# Patient Record
Sex: FEMALE | Race: WHITE | NOT HISPANIC OR LATINO | Employment: UNEMPLOYED
[De-identification: names, ages, dates, MRNs, and addresses within clinical notes are randomized per-mention and may not be internally consistent; named-entity substitution may affect disease eponyms.]

---

## 2017-07-08 ENCOUNTER — HEALTH MAINTENANCE LETTER (OUTPATIENT)
Age: 62
End: 2017-07-08

## 2017-07-21 ENCOUNTER — TELEPHONE (OUTPATIENT)
Dept: ENDOCRINOLOGY | Facility: CLINIC | Age: 62
End: 2017-07-21

## 2017-07-21 NOTE — TELEPHONE ENCOUNTER
Pt is concerned DEXA scan are read differently. Pt has DEXA scan scheduled 9/1/17 in [place]. Pt is experiencing inflammation between shoulder blades, pt seen provider for this and was told it is just inflammation between 5 vertebrae. Pt saw PCP today. Pt has prescription for Forteo until December 2017. Triglycerides are elevated, wondering if due to Forteo.   Informed pt that Dr. Casey will be in touch next week.

## 2017-08-04 ENCOUNTER — TELEPHONE (OUTPATIENT)
Dept: ENDOCRINOLOGY | Facility: CLINIC | Age: 62
End: 2017-08-04

## 2017-08-04 DIAGNOSIS — M81.0 SENILE OSTEOPOROSIS: Primary | ICD-10-CM

## 2017-08-04 NOTE — LETTER
Patient:  Nathalia Syed  :   1955  MRN:     [id number]        Ms.Kathy Annie Syed  [address] [place] [address]    17  Dear Mr. Delong    This letter is on behalf of Nathalia.  I am one of the endocrinologist working with her. She is now doing better but just needs to avoid risk for falls. She has been very compliant with the treatment program. She has the following issues:     #1 osteoporosis with fracture  This patient has osteoporosis with subsequent fracture.  She suffered multiple fractures and therefore is at high risk for future fractures even though she is complying with treatment.  I would recommend that accommodations be made to reduce her risk for fractures    #2 limitations created by health condition  I would recommend that the patient cannot lift more than 10 pounds as well as precautions  to reduce fall risks.    #3 accommodations adjusting to the work environment or schedule  I would recommend that the patient not be placed at risk for falls. 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 I anticipate that this would only take about 3-5% of her effort as she is able to function within the classroom.  She is otherwise functional and able to work and this very minor assistance (if needed) should not interfere with her ability to perform her job. I will be reassessing her yearly (she has an upcoming appointment).    If you have any questions, please feel free to contact my nurse at [phone] select option #3 for triage nurse.    Regards  Kavitha Casey MD

## 2017-08-04 NOTE — TELEPHONE ENCOUNTER
----- Message from Opal Rolle RN sent at 7/21/2017  4:56 PM CDT -----  Regarding: More info.  Pt is concerned DEXA scan are read differently. Pt has DEXA scan scheduled 9/1/17 in [place]. Pt is experiencing inflammation between shoulder blades, pt seen provider for this and was told it is just inflammation between 5 vertebrae. Pt saw PCP today. Pt has prescription for Forteo until December 2017. Triglycerides are elevated, wondering if due to Forteo.   Informed pt that you will be in touch next week.   Opal CASTRO  ----- Message -----     From: Kavitha Casey MD     Sent: 7/21/2017   4:52 PM       To: Opal Rolle RN    Please let pt know that I got her message and need to think about the best follow up plan - I will be in touch with her next week as I will be gone on Monday

## 2017-08-09 ENCOUNTER — TELEPHONE (OUTPATIENT)
Dept: ENDOCRINOLOGY | Facility: CLINIC | Age: 62
End: 2017-08-09

## 2017-08-09 NOTE — TELEPHONE ENCOUNTER
Social Work:  D/I: Request from Dr Casey to contact Pt re workplace issues re accommodations. 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 I encouraged her to seek help from an attny at the union or union rep in regards to these threats. Also encouraged her to read the Americans with Disabilities Act which is available online to get more info about her rights. She hasn't received the letter yet from Dr Casey (it should arrive soon) re accommodations at work.  P: Pt plans to f/u on the suggestions above so she is prepared for conversations with her leadership. She has my contact info for further needs.

## 2017-09-01 ENCOUNTER — TRANSFERRED RECORDS (OUTPATIENT)
Dept: HEALTH INFORMATION MANAGEMENT | Facility: CLINIC | Age: 62
End: 2017-09-01

## 2017-10-09 ENCOUNTER — TELEPHONE (OUTPATIENT)
Dept: ENDOCRINOLOGY | Facility: CLINIC | Age: 62
End: 2017-10-09

## 2017-10-09 NOTE — TELEPHONE ENCOUNTER
Pt had BMD done on 9/1/2017, with evidence of bone loss seen at the left total hip compared with the 2016 exam and increase of 2.6% in the lumbar spine.  Will scan in.

## 2017-10-20 ENCOUNTER — TELEPHONE (OUTPATIENT)
Dept: ENDOCRINOLOGY | Facility: CLINIC | Age: 62
End: 2017-10-20

## 2017-10-20 ENCOUNTER — OFFICE VISIT (OUTPATIENT)
Dept: ENDOCRINOLOGY | Facility: CLINIC | Age: 62
End: 2017-10-20

## 2017-10-20 VITALS — HEART RATE: 70 BPM | DIASTOLIC BLOOD PRESSURE: 81 MMHG | SYSTOLIC BLOOD PRESSURE: 146 MMHG

## 2017-10-20 DIAGNOSIS — M81.0 SENILE OSTEOPOROSIS: Primary | ICD-10-CM

## 2017-10-20 DIAGNOSIS — R51.9 NONINTRACTABLE HEADACHE, UNSPECIFIED CHRONICITY PATTERN, UNSPECIFIED HEADACHE TYPE: Primary | ICD-10-CM

## 2017-10-20 DIAGNOSIS — M81.0 SENILE OSTEOPOROSIS: ICD-10-CM

## 2017-10-20 LAB
ALBUMIN SERPL-MCNC: 3.9 G/DL (ref 3.4–5)
ALP SERPL-CCNC: 110 U/L (ref 40–150)
ALT SERPL W P-5'-P-CCNC: 34 U/L (ref 0–50)
ANION GAP SERPL CALCULATED.3IONS-SCNC: 7 MMOL/L (ref 3–14)
AST SERPL W P-5'-P-CCNC: 20 U/L (ref 0–45)
BILIRUB SERPL-MCNC: 0.4 MG/DL (ref 0.2–1.3)
BUN SERPL-MCNC: 14 MG/DL (ref 7–30)
CALCIUM SERPL-MCNC: 9.6 MG/DL (ref 8.5–10.1)
CHLORIDE SERPL-SCNC: 106 MMOL/L (ref 94–109)
CHOLEST SERPL-MCNC: 237 MG/DL
CO2 SERPL-SCNC: 28 MMOL/L (ref 20–32)
CREAT SERPL-MCNC: 0.6 MG/DL (ref 0.52–1.04)
CRP SERPL-MCNC: <2.9 MG/L (ref 0–8)
ERYTHROCYTE [SEDIMENTATION RATE] IN BLOOD BY WESTERGREN METHOD: 13 MM/H (ref 0–30)
GFR SERPL CREATININE-BSD FRML MDRD: >90 ML/MIN/1.7M2
GLUCOSE SERPL-MCNC: 71 MG/DL (ref 70–99)
HDLC SERPL-MCNC: 55 MG/DL
LDLC SERPL CALC-MCNC: 127 MG/DL
NONHDLC SERPL-MCNC: 182 MG/DL
POTASSIUM SERPL-SCNC: 3.5 MMOL/L (ref 3.4–5.3)
PROT SERPL-MCNC: 7.8 G/DL (ref 6.8–8.8)
SODIUM SERPL-SCNC: 140 MMOL/L (ref 133–144)
T4 FREE SERPL-MCNC: 0.82 NG/DL (ref 0.76–1.46)
TRIGL SERPL-MCNC: 274 MG/DL
TSH SERPL DL<=0.005 MIU/L-ACNC: 1.7 MU/L (ref 0.4–4)

## 2017-10-20 RX ORDER — DULOXETIN HYDROCHLORIDE 20 MG/1
20 CAPSULE, DELAYED RELEASE ORAL DAILY
Qty: 10 CAPSULE | Refills: 0 | Status: SHIPPED | OUTPATIENT
Start: 2017-10-20 | End: 2018-10-05

## 2017-10-20 ASSESSMENT — PAIN SCALES - GENERAL: PAINLEVEL: NO PAIN (0)

## 2017-10-20 NOTE — TELEPHONE ENCOUNTER
Spoke with nurse Sunshine, informed faxed progress note from today from Dr. Casey, confirmed fax below, confirmed receipt.   ----- Message from Kavitha Casey MD sent at 10/20/2017  3:12 PM CDT -----  Please fax letter from today to pt's primary provider at [phone] (fax)  And call [phone] to confirm

## 2017-10-20 NOTE — LETTER
"Patient:  Nathalia Syed  :   1955  MRN:     [id number]        Ms.Kathy Annie Syed  [address]  [facility] [address]        2017    Dear Nathalia    Here are your labs. This is most significant for an elevated triglyceride for hopefully the fish oil will help. Your markers of inflammation, thyroid, celiac disease, and vitamin D were all entirely normal. This is great news.    I would recommend that you follow up the effects of the fish oil the Cymbalta with your primary provider. I would defer to him whether he may consider add a \"fibrate\" to the fish oil to bring down the triglycerides further. Please also reduce the sugar in your diet. This will help.     If you have any questions, please feel free to contact my nurse at [phone] select option #3 for triage nurse  or  option #1 for scheduling related questions.    Regards    Kavitha Casey MD      Resulted Orders   25 Hydroxyvitamin D2 and D3   Result Value Ref Range    25 OH Vit D2 <5 ug/L    25 OH Vit D3 47 ug/L    25 OH Vit D total <52 20 - 75 ug/L      Comment:      Season, race, dietary intake, and treatment affect the concentration of   25-hydroxy-Vitamin D. Values may decrease during winter months and increase   during summer months. Values 20-29 ug/L may indicate Vitamin D insufficiency   and values <20 ug/L may indicate Vitamin D deficiency.  This test was developed and its performance characteristics determined by the   [facility],  [facility] Laboratory. It has   not been cleared or approved by the FDA. The laboratory is regulated under   CLIA as qualified to perform high-complexity testing. This test is used for   clinical purposes. It should not be regarded as investigational or for   research.     Comprehensive metabolic panel   Result Value Ref Range    Sodium 140 133 - 144 mmol/L    Potassium 3.5 3.4 - 5.3 mmol/L    Chloride 106 94 - 109 mmol/L    Carbon Dioxide 28 20 - 32 mmol/L    Anion " Gap 7 3 - 14 mmol/L    Glucose 71 70 - 99 mg/dL    Urea Nitrogen 14 7 - 30 mg/dL    Creatinine 0.60 0.52 - 1.04 mg/dL    GFR Estimate >90 >60 mL/min/1.7m2      Comment:      Non  GFR Calc    GFR Estimate If Black >90 >60 mL/min/1.7m2      Comment:       GFR Calc    Calcium 9.6 8.5 - 10.1 mg/dL    Bilirubin Total 0.4 0.2 - 1.3 mg/dL    Albumin 3.9 3.4 - 5.0 g/dL    Protein Total 7.8 6.8 - 8.8 g/dL    Alkaline Phosphatase 110 40 - 150 U/L    ALT 34 0 - 50 U/L    AST 20 0 - 45 U/L   TSH   Result Value Ref Range    TSH 1.70 0.40 - 4.00 mU/L   T4 free   Result Value Ref Range    T4 Free 0.82 0.76 - 1.46 ng/dL   Tissue transglutaminase reynaldo IgA and IgG   Result Value Ref Range    Tissue Transglutaminase Antibody IgA <1 <7 U/mL      Comment:      Negative  The tTG-IgA assay has limited utility for patients with decreased levels of   IgA. Screening for celiac disease should include IgA testing to rule out   selective IgA deficiency and to guide selection and interpretation of   serological testing. tTG-IgG testing may be positive in celiac disease   patients with IgA deficiency.      Tissue Transglutaminase Reynaldo IgG 1 <7 U/mL      Comment:      Negative   Lipid Profile   Result Value Ref Range    Cholesterol 237 (H) <200 mg/dL      Comment:      Desirable:       <200 mg/dl    Triglycerides 274 (H) <150 mg/dL      Comment:      Borderline high:  150-199 mg/dl  High:             200-499 mg/dl  Very high:       >499 mg/dl      HDL Cholesterol 55 >49 mg/dL    LDL Cholesterol Calculated 127 (H) <100 mg/dL      Comment:      Above desirable:  100-129 mg/dl  Borderline High:  130-159 mg/dL  High:             160-189 mg/dL  Very high:       >189 mg/dl      Non HDL Cholesterol 182 (H) <130 mg/dL      Comment:      Above Desirable:  130-159 mg/dl  Borderline high:  160-189 mg/dl  High:             190-219 mg/dl  Very high:       >219 mg/dl     CRP inflammation   Result Value Ref Range    CRP Inflammation  <2.9 0.0 - 8.0 mg/L       Cc: primary provider

## 2017-10-20 NOTE — MR AVS SNAPSHOT
"              After Visit Summary   10/20/2017    Nathalia Syed    MRN: [id number]           Patient Information     Date Of Birth          1955        Visit Information        Provider Department      10/20/2017 2:00 PM Kavitha Casey MD  [facility]        Today's Diagnoses     Nonintractable headache, unspecified chronicity pattern, unspecified headache type    -  1      Care Instructions    Freeze the fish oil- take 1 tablet three times per day    eval of headaches - consider neurology eval and MRI    Take a baby aspirin per day    Reduce the carbs (pastas, breads, cookies),     cymbalta at 20 mg daily    For scheduling appointments or labs, please request an appointment through [employer] or call [phone] select option #3 for triage nurse    For questions for your provider or the endocrine nurse, please send a [employer] message or call [phone] select option #3 for triage nurse    If this is an emergency overnight or on weekends, please call [phone]. This will get you the [employer] . Please ask for adult endocrinology \"on call\" and they will connect you to one of my colleagues who will always be available.          Follow-ups after your visit        Follow-up notes from your care team     Return in about 1 year (around 10/20/2018).      Future tests that were ordered for you today     Open Future Orders        Priority Expected Expires Ordered    Erythrocyte sedimentation rate auto Routine  10/20/2018 10/20/2017            Who to contact     Please call your clinic at [phone] to:    Ask questions about your health    Make or cancel appointments    Discuss your medicines    Learn about your test results    Speak to your doctor   If you have compliments or concerns about an experience at your clinic, or if you wish to file a complaint, please contact [facility] Patient Relations at [phone] or email us at [email or web address]   "       Additional Information About Your Visit        [email or web address] Information     [employer] gives you secure access to your electronic health record. If you see a primary care provider, you can also send messages to your care team and make appointments. If you have questions, please call your primary care clinic.  If you do not have a primary care provider, please call [phone] and they will assist you.      [employer] is an electronic gateway that provides easy, online access to your medical records. With [employer], you can request a clinic appointment, read your test results, renew a prescription or communicate with your care team.     To access your existing account, please contact your [facility] or call [phone] for assistance.        Care EveryWhere ID     This is your Care EveryWhere ID. This could be used by other organizations to access your [facility] medical records  [id number]        Your Vitals Were     Pulse                   70            Blood Pressure from Last 3 Encounters:   10/20/17 146/81   03/20/15 130/79    Weight from Last 3 Encounters:   No data found for Wt                 Today's Medication Changes          These changes are accurate as of: 10/20/17  2:51 PM.  If you have any questions, ask your nurse or doctor.               Start taking these medicines.        Dose/Directions    DULoxetine 20 MG EC capsule   Commonly known as:  CYMBALTA   Used for:  Nonintractable headache, unspecified chronicity pattern, unspecified headache type   Started by:  Kavitha Casey MD        Dose:  20 mg   Take 1 capsule (20 mg) by mouth daily   Quantity:  10 capsule   Refills:  0            Where to get your medicines      These medications were sent to [facility] [address] [address]    Hours:  TRANSPLANT PHONE NUMBER [phone] Phone:  [phone]     DULoxetine 20 MG EC  capsule                Primary Care Provider Office Phone # Fax #    Inder Nicholson [phone]       [place] [address] [facility] [address]        Equal Access to Services     MARITZA SHORT : Hadii nette ku hadcurtiso Soomaali, waaxda luqadaha, qaybta kaalmada adeegtamara, sheryl flores francericky mo laOliviajovanna kwan. So [facility] [phone].    ATENCIÓN: Si habla español, tiene a samayoa disposición servicios gratuitos de asistencia lingüística. [facility] [phone].    We comply with applicable federal civil rights laws and [place] laws. We do not discriminate on the basis of race, color, national origin, age, disability, sex, sexual orientation, or gender identity.            Thank you!     Thank you for choosing [facility]  for your care. Our goal is always to provide you with excellent care. Hearing back from our patients is one way we can continue to improve our services. Please take a few minutes to complete the written survey that you may receive in the mail after your visit with us. Thank you!             Your Updated Medication List - Protect others around you: Learn how to safely use, store and throw away your medicines at www.disposemymeds.org.          This list is accurate as of: 10/20/17  2:51 PM.  Always use your most recent med list.                   Brand Name Dispense Instructions for use Diagnosis    calcium carbonate 1250 MG tablet    OS-MEGHAN 500 mg [place]. [place]     Take 1,000 mg by mouth 2 times daily        DULoxetine 20 MG EC capsule    CYMBALTA    10 capsule    Take 1 capsule (20 mg) by mouth daily    Nonintractable headache, unspecified chronicity pattern, unspecified headache type       FAMVIR PO      Take 500 mg by mouth As needed        FORTEO 600 MCG/2.4ML Soln injection   Generic drug:  teriparatide (recombinant)     2.4 mL    Inject 0.08 mLs (20 mcg) Subcutaneous daily        MAGNESIUM OXIDE PO      Take 250 mg by mouth        POTASSIUM CITRATE PO            VITAMIN B 12 PO      Take 1,000 mcg by mouth        VITAMIN D (CHOLECALCIFEROL) PO      Take 2,000 Units by mouth daily

## 2017-10-20 NOTE — LETTER
10/20/2017       RE: Nathalia Syed  [address]  [facility] [address]     Dear Colleague,    Thank you for referring your patient, Nathalia Syed, to the [facility]. Please see a copy of my visit note below.    Nathalia is a 62 year old female presents today for RECHECK (Osteoporosis)  .    HPI  #1 osteoporosis  Patient was initially seen in March 2015.  She was noted to have osteoporosis.  She underwent menopause in 2007.  She has not been on hormone replacement therapy.  Extensive evaluation in 2015 was most significant for a high she is currently on Forteo as of September 2016.  She is uncertain whether she has side effects related to the Forteo-see below    #2 hypertension  The patient reports a history of recently labile blood pressure.  She reports that her home blood pressures generally 120/80 although recently her blood pressures [place] 140s to 160s over 80s..    #3 hypertriglycerides  The patient reports a long-standing history of normal triglycerides.  However over the past year, her triglycerides are in the 240s to 270s range.  There is no personal history of diabetes.  She is currently not on treatment.  She reports a strong family history of hypertriglyceridemia and hyperlipidemia cardiovascular events.    #4 headaches and dizziness  Since January 2017, the patient reports a history of headaches, eye pain, and dizziness.  She had an EEG in September 2017 which was unremarkable.  There was no clear exacerbating or relieving factors.  She reports that the intermittent head pain can be bilateral.  She's never had a head MRI.  She is uncertain whether her symptoms are necessarily related to her Forteo, although she was on the Forteo without symptoms for roughly 3 months.    Past Medical History  No past medical history on file.    Allergies  Allergies   Allergen Reactions     Tramadol Itching and Rash     Aspirin Rash     stomach      Codeine Rash and GI Disturbance     Rofecoxib Rash     Medications  Current Outpatient Prescriptions   Medication Sig Dispense Refill     teriparatide, recombinant, (FORTEO) 600 MCG/2.4ML SOLN injection Inject 0.08 mLs (20 mcg) Subcutaneous daily 2.4 mL 1     DULoxetine (CYMBALTA) 20 MG EC capsule Take 1 capsule (20 mg) by mouth daily 10 capsule 0     VITAMIN D, CHOLECALCIFEROL, PO Take 2,000 Units by mouth daily       calcium carbonate (OS-MEGHAN 500 MG [place]. [place]) 500 MG tablet Take 1,000 mg by mouth 2 times daily       POTASSIUM CITRATE PO        MAGNESIUM OXIDE PO Take 250 mg by mouth       Cyanocobalamin (VITAMIN B 12 PO) Take 1,000 mcg by mouth       Famciclovir (FAMVIR PO) Take 500 mg by mouth As needed       Family History  family history is not on file.  Social History  Social History     Social History     Marital status:      Spouse name: N/A     Number of children: N/A     Years of education: N/A     Occupational History     Not on file.     Social History Main Topics     Smoking status: Never Smoker     Smokeless tobacco: Not on file     Alcohol use Not on file     Drug use: Not on file     Sexual activity: Not on file     Other Topics Concern     Not on file     Social History Narrative     No narrative on file       ROS  General: no change in weight  CV: no complaints  Pulmonary: No complaints  GI: No complaints  : No complaints  MsK: No complaints  Infection: Pt denies  Skin: Pt denies rash  Heme: Pt denies  Neuro: Pt denies      Physical Exam  /81  Pulse 70  There is no height or weight on file to calculate BMI.    Exam:  GENERAL APPEARANCE: Alert and no distress  NECK: No lymphadenopathy appreciated  Eyes: No obvious retinopathy noted  Thyroid: No obvious nodules palpated   CV: RRR without M/R/G  Lungs: CTA bilaterally  Abdomen: Soft, Nontender, non distended, positive bowel sounds   Neuro: normal reflexes, no focal deficits  Skin: No infection in feet   Mood: Normal   Lymph: neg in neck  and supraclavicular area           RESULTS  Orders Only on 10/20/2017   Component Date Value Ref Range Status     Sodium 10/20/2017 140  133 - 144 mmol/L Final     Potassium 10/20/2017 3.5  3.4 - 5.3 mmol/L Final     Chloride 10/20/2017 106  94 - 109 mmol/L Final     Carbon Dioxide 10/20/2017 28  20 - 32 mmol/L Final     Anion Gap 10/20/2017 7  3 - 14 mmol/L Final     Glucose 10/20/2017 71  70 - 99 mg/dL Final     Urea Nitrogen 10/20/2017 14  7 - 30 mg/dL Final     Creatinine 10/20/2017 0.60  0.52 - 1.04 mg/dL Final     GFR Estimate 10/20/2017 >90  >60 mL/min/1.7m2 Final    Non  GFR Calc     GFR Estimate If Black 10/20/2017 >90  >60 mL/min/1.7m2 Final    African American GFR Calc     Calcium 10/20/2017 9.6  8.5 - 10.1 mg/dL Final     Bilirubin Total 10/20/2017 0.4  0.2 - 1.3 mg/dL Final     Albumin 10/20/2017 3.9  3.4 - 5.0 g/dL Final     Protein Total 10/20/2017 7.8  6.8 - 8.8 g/dL Final     Alkaline Phosphatase 10/20/2017 110  40 - 150 U/L Final     ALT 10/20/2017 34  0 - 50 U/L Final     AST 10/20/2017 20  0 - 45 U/L Final     TSH 10/20/2017 1.70  0.40 - 4.00 mU/L Final     T4 Free 10/20/2017 0.82  0.76 - 1.46 ng/dL Final     Cholesterol 10/20/2017 237* <200 mg/dL Final    Desirable:       <200 mg/dl     Triglycerides 10/20/2017 274* <150 mg/dL Final    Comment: Borderline high:  150-199 mg/dl  High:             200-499 mg/dl  Very high:       >499 mg/dl       HDL Cholesterol 10/20/2017 55  >49 mg/dL Final     LDL Cholesterol Calculated 10/20/2017 127* <100 mg/dL Final    Comment: Above desirable:  100-129 mg/dl  Borderline High:  130-159 mg/dL  High:             160-189 mg/dL  Very high:       >189 mg/dl       Non HDL Cholesterol 10/20/2017 182* <130 mg/dL Final    Comment: Above Desirable:  130-159 mg/dl  Borderline high:  160-189 mg/dl  High:             190-219 mg/dl  Very high:       >219 mg/dl       CRP Inflammation 10/20/2017 <2.9  0.0 - 8.0 mg/L Final               ASSESSMENT:    #1  osteoporosis  The patient is currently on Forteo.  Although her recent bone density shows possible loss of the level left hip, this is the best medication for bone building at this time.  We'll have the patient continue with Forteo for now and consider Prolia in the future once her she's done with her Forteo.  I think is highly unlikely that her symptoms of her headaches and are blood pressure related to the Forteo.  If the workup is otherwise unremarkable from a neurological standpoint (see below), we will revisit whether we may consider stopping the Forteo starting been also met.  However my preference is for the patient finished two years of Forteo if possible.    #2 hypertension  The patient has high blood pressure today noted in the 160s over 80s.  Reviewing her chart, she's had a history of normal blood pressure.  She does have significant lability and her blood pressure.  Extensive discussion with patient.  She has evidence of normal blood pressure, I'm wondering whether her mood and anxiety may be exacerbating her blood pressure.  We'll have the patient try Cymbalta 20 mg daily and follow up with her primary provider.  Although it is highly unlikely, I will have the patient obtain plasma metanephrine status rule out pheochromocytoma for her spell like symptoms and associated hypertension.    #3 hypertriglycerides  I am uncertain of the reason for her hypertriglyceridemia.  Her glucose is normal.  Her TSH is normal.  Not on estrogen.  She does not drink alcohol.  Recommended patient reduce carbohydrate intake.  She should take fish oil and 1000 mg three times per day.  She will follow up with her primary provider. Pt to take baby aspirin daily.     #4 possible anxiety  I am wondering whether the patient's anxiety might be causing lability of her blood pressure.  We'll have the patient start Cymbalta at 20 mg daily and she'll follow with her primary provider.  I will let him know.     #5 headaches  The patient  reports a history of intermittent headaches and eye pain.  Etiology uncertain.  We'll check sedimentation rate and C-reactive protein.  Recommended the patient consider head MRI and neurology follow-up locally.    We will plan to have the patient return to see me in one year, sooner if needed.    I spent 40 minutes with this patient face to face and explained the conditions and plans (more than 50% of time was spent in  discussion of bone management) . The patient understood and is satisfied with today's visit.       Kavitha Casey MD

## 2017-10-20 NOTE — PROGRESS NOTES
Nathalia is a 62 year old female presents today for RECHECK (Osteoporosis)  .    HPI  #1 osteoporosis  Patient was initially seen in March 2015.  She was noted to have osteoporosis.  She underwent menopause in 2007.  She has not been on hormone replacement therapy.  Extensive evaluation in 2015 was most significant for a high she is currently on Forteo as of September 2016.  She is uncertain whether she has side effects related to the Forteo-see below    #2 hypertension  The patient reports a history of recently labile blood pressure.  She reports that her home blood pressures generally 120/80 although recently her blood pressures [place] 140s to 160s over 80s..    #3 hypertriglycerides  The patient reports a long-standing history of normal triglycerides.  However over the past year, her triglycerides are in the 240s to 270s range.  There is no personal history of diabetes.  She is currently not on treatment.  She reports a strong family history of hypertriglyceridemia and hyperlipidemia cardiovascular events.    #4 headaches and dizziness  Since January 2017, the patient reports a history of headaches, eye pain, and dizziness.  She had an EEG in September 2017 which was unremarkable.  There was no clear exacerbating or relieving factors.  She reports that the intermittent head pain can be bilateral.  She's never had a head MRI.  She is uncertain whether her symptoms are necessarily related to her Forteo, although she was on the Forteo without symptoms for roughly 3 months.    Past Medical History  No past medical history on file.    Allergies  Allergies   Allergen Reactions     Tramadol Itching and Rash     Aspirin Rash     stomach     Codeine Rash and GI Disturbance     Rofecoxib Rash     Medications  Current Outpatient Prescriptions   Medication Sig Dispense Refill     teriparatide, recombinant, (FORTEO) 600 MCG/2.4ML SOLN injection Inject 0.08 mLs (20 mcg) Subcutaneous daily 2.4 mL 1     DULoxetine (CYMBALTA) 20 MG  EC capsule Take 1 capsule (20 mg) by mouth daily 10 capsule 0     VITAMIN D, CHOLECALCIFEROL, PO Take 2,000 Units by mouth daily       calcium carbonate (OS-MEGHAN 500 MG [place]. CA) 500 MG tablet Take 1,000 mg by mouth 2 times daily       POTASSIUM CITRATE PO        MAGNESIUM OXIDE PO Take 250 mg by mouth       Cyanocobalamin (VITAMIN B 12 PO) Take 1,000 mcg by mouth       Famciclovir (FAMVIR PO) Take 500 mg by mouth As needed       Family History  family history is not on file.  Social History  Social History     Social History     Marital status:      Spouse name: N/A     Number of children: N/A     Years of education: N/A     Occupational History     Not on file.     Social History Main Topics     Smoking status: Never Smoker     Smokeless tobacco: Not on file     Alcohol use Not on file     Drug use: Not on file     Sexual activity: Not on file     Other Topics Concern     Not on file     Social History Narrative     No narrative on file       ROS  General: no change in weight  CV: no complaints  Pulmonary: No complaints  GI: No complaints  : No complaints  MsK: No complaints  Infection: Pt denies  Skin: Pt denies rash  Heme: Pt denies  Neuro: Pt denies      Physical Exam  /81  Pulse 70  There is no height or weight on file to calculate BMI.    Exam:  GENERAL APPEARANCE: Alert and no distress  NECK: No lymphadenopathy appreciated  Eyes: No obvious retinopathy noted  Thyroid: No obvious nodules palpated   CV: RRR without M/R/G  Lungs: CTA bilaterally  Abdomen: Soft, Nontender, non distended, positive bowel sounds   Neuro: normal reflexes, no focal deficits  Skin: No infection in feet   Mood: Normal   Lymph: neg in neck and supraclavicular area           RESULTS  Orders Only on 10/20/2017   Component Date Value Ref Range Status     Sodium 10/20/2017 140  133 - 144 mmol/L Final     Potassium 10/20/2017 3.5  3.4 - 5.3 mmol/L Final     Chloride 10/20/2017 106  94 - 109 mmol/L Final     Carbon Dioxide  10/20/2017 28  20 - 32 mmol/L Final     Anion Gap 10/20/2017 7  3 - 14 mmol/L Final     Glucose 10/20/2017 71  70 - 99 mg/dL Final     Urea Nitrogen 10/20/2017 14  7 - 30 mg/dL Final     Creatinine 10/20/2017 0.60  0.52 - 1.04 mg/dL Final     GFR Estimate 10/20/2017 >90  >60 mL/min/1.7m2 Final    Non  GFR Calc     GFR Estimate If Black 10/20/2017 >90  >60 mL/min/1.7m2 Final    African American GFR Calc     Calcium 10/20/2017 9.6  8.5 - 10.1 mg/dL Final     Bilirubin Total 10/20/2017 0.4  0.2 - 1.3 mg/dL Final     Albumin 10/20/2017 3.9  3.4 - 5.0 g/dL Final     Protein Total 10/20/2017 7.8  6.8 - 8.8 g/dL Final     Alkaline Phosphatase 10/20/2017 110  40 - 150 U/L Final     ALT 10/20/2017 34  0 - 50 U/L Final     AST 10/20/2017 20  0 - 45 U/L Final     TSH 10/20/2017 1.70  0.40 - 4.00 mU/L Final     T4 Free 10/20/2017 0.82  0.76 - 1.46 ng/dL Final     Cholesterol 10/20/2017 237* <200 mg/dL Final    Desirable:       <200 mg/dl     Triglycerides 10/20/2017 274* <150 mg/dL Final    Comment: Borderline high:  150-199 mg/dl  High:             200-499 mg/dl  Very high:       >499 mg/dl       HDL Cholesterol 10/20/2017 55  >49 mg/dL Final     LDL Cholesterol Calculated 10/20/2017 127* <100 mg/dL Final    Comment: Above desirable:  100-129 mg/dl  Borderline High:  130-159 mg/dL  High:             160-189 mg/dL  Very high:       >189 mg/dl       Non HDL Cholesterol 10/20/2017 182* <130 mg/dL Final    Comment: Above Desirable:  130-159 mg/dl  Borderline high:  160-189 mg/dl  High:             190-219 mg/dl  Very high:       >219 mg/dl       CRP Inflammation 10/20/2017 <2.9  0.0 - 8.0 mg/L Final               ASSESSMENT:    #1 osteoporosis  The patient is currently on Forteo.  Although her recent bone density shows possible loss of the level left hip, this is the best medication for bone building at this time.  We'll have the patient continue with Forteo for now and consider Prolia in the future once her she's  done with her Forteo.  I think is highly unlikely that her symptoms of her headaches and are blood pressure related to the Forteo.  If the workup is otherwise unremarkable from a neurological standpoint (see below), we will revisit whether we may consider stopping the Forteo starting been also met.  However my preference is for the patient finished two years of Forteo if possible.    #2 hypertension  The patient has high blood pressure today noted in the 160s over 80s.  Reviewing her chart, she's had a history of normal blood pressure.  She does have significant lability and her blood pressure.  Extensive discussion with patient.  She has evidence of normal blood pressure, I'm wondering whether her mood and anxiety may be exacerbating her blood pressure.  We'll have the patient try Cymbalta 20 mg daily and follow up with her primary provider.  Although it is highly unlikely, I will have the patient obtain plasma metanephrine status rule out pheochromocytoma for her spell like symptoms and associated hypertension.    #3 hypertriglycerides  I am uncertain of the reason for her hypertriglyceridemia.  Her glucose is normal.  Her TSH is normal.  Not on estrogen.  She does not drink alcohol.  Recommended patient reduce carbohydrate intake.  She should take fish oil and 1000 mg three times per day.  She will follow up with her primary provider. Pt to take baby aspirin daily.     #4 possible anxiety  I am wondering whether the patient's anxiety might be causing lability of her blood pressure.  We'll have the patient start Cymbalta at 20 mg daily and she'll follow with her primary provider.  I will let him know.     #5 headaches  The patient reports a history of intermittent headaches and eye pain.  Etiology uncertain.  We'll check sedimentation rate and C-reactive protein.  Recommended the patient consider head MRI and neurology follow-up locally.    We will plan to have the patient return to see me in one year, sooner if  needed.    I spent 40 minutes with this patient face to face and explained the conditions and plans (more than 50% of time was spent in  discussion of bone management) . The patient understood and is satisfied with today's visit.

## 2017-10-20 NOTE — LETTER
Patient:  Nathalia Syed  :   1955  MRN:     [id number]        Ms.Nathalia Syed  [address]   [place] [address]        2017    Dear Nathalia    Here are your results which are within normal limits. This is a marker of inflammation and hormonal hypersecretion.    If you have any questions, please feel free to contact my nurse at [phone] select option #3 for triage nurse  or  option #1 for scheduling related questions.    Regards    Kavitha Casey MD      Resulted Orders   Metanephrines Plasma Free   Result Value Ref Range    Normetanephrine 0.33 0.00 - 0.89 nmol/L    Metanephrine <0.10 0.00 - 0.49 nmol/L    Metanephrines Interpretation SEE NOTE       Comment:      (Note)  INTERPRETIVE INFORMATION: Metanephrines, Plasma (Free)  This test is useful in the detection of pheochromocytoma, a   rare neuroendocrine tumor. The majority of patients with   pheochromocytoma have a plasma normetanephrine   concentration in excess of 2.2 nmol/L and/or a metanephrine   concentration in excess of 1.1 nmol/L. Increased   concentrations of these analytes serve as confirmation for   diagnosis. Patients with essential hypertension and plasma   concentrations of normetanephrine below 0.9 nmol/L and a   metanephrine concentration below 0.5 nmol/L, can be   excluded from further testing. If clinical suspicion   remains, repeat testing or testing for metanephrines in a   24-hr. urine specimen should be considered.  See Compliance Statement B: [facility]  Performed by [facility],  [address] [place] [id number] [phone]  www.[email or web address], Warren Chaves MD, Lab. Director     Erythrocyte sedimentation rate auto   Result Value Ref Range    Sed Rate 13 0 - 30 mm/h       Kavitha Casey MD

## 2017-10-20 NOTE — PATIENT INSTRUCTIONS
"Freeze the fish oil- take 1 tablet three times per day    eval of headaches - consider neurology eval and MRI    Take a baby aspirin per day    Reduce the carbs (pastas, breads, cookies),     cymbalta at 20 mg daily    For scheduling appointments or labs, please request an appointment through [facility] or call [phone] select option #3 for triage nurse    For questions for your provider or the endocrine nurse, please send a [facility] message or call [phone] select option #3 for triage nurse    If this is an emergency overnight or on weekends, please call [phone]. This will get you the [employer] . Please ask for adult endocrinology \"on call\" and they will connect you to one of my colleagues who will always be available.  "

## 2017-10-22 LAB
TTG IGA SER-ACNC: <1 U/ML
TTG IGG SER-ACNC: 1 U/ML

## 2017-10-23 LAB
ANNOTATION COMMENT IMP: NORMAL
METANEPHS SERPL-SCNC: <0.1 NMOL/L (ref 0–0.49)
NORMETANEPHRINE SERPL-SCNC: 0.33 NMOL/L (ref 0–0.89)

## 2017-10-24 LAB
DEPRECATED CALCIDIOL+CALCIFEROL SERPL-MC: <52 UG/L (ref 20–75)
VITAMIN D2 SERPL-MCNC: <5 UG/L
VITAMIN D3 SERPL-MCNC: 47 UG/L

## 2017-10-24 NOTE — PROGRESS NOTES
"Dear Nathalia    Here are your labs. This is most significant for an elevated triglyceride for hopefully the fish oil will help. Your markers of inflammation, thyroid, celiac disease, and vitamin D were all entirely normal. This is great news.    I would recommend that you follow up the effects of the fish oil the Cymbalta with your primary provider. I would defer to him whether he may consider add a \"fibrate\" to the fish oil to bring down the triglycerides further. Please also reduce the sugar in your diet. This will help.     If you have any questions, please feel free to contact my nurse at [phone] select option #3 for triage nurse  or  option #1 for scheduling related questions.    Regards    Kavitha Casey MD"

## 2017-10-24 NOTE — PROGRESS NOTES
The  results which are within normal limits. Will continue with plan as discussed. Pt informed.    Kavitha Casey MD  10/24/2017  5:24 PM

## 2017-11-14 ENCOUNTER — TELEPHONE (OUTPATIENT)
Dept: ENDOCRINOLOGY | Facility: CLINIC | Age: 62
End: 2017-11-14

## 2017-11-14 PROBLEM — R13.10 DIFFICULTY SWALLOWING: Status: ACTIVE | Noted: 2017-11-14

## 2017-11-14 NOTE — TELEPHONE ENCOUNTER
PA sent  For Prolia  but if she has this done locally out of the [employer] system that facility may have to do a PA there.

## 2017-11-14 NOTE — TELEPHONE ENCOUNTER
Called MD - no clear etiology for her symptoms. Once she is done with her current forteo supply- we will have her stop and try the prolia. Will order the prolia. Dr. Nicholson will work on having it done locally. Therapy plan submitted.    Called pt - pt not in. Message left.

## 2017-11-15 ENCOUNTER — TELEPHONE (OUTPATIENT)
Dept: ENDOCRINOLOGY | Facility: CLINIC | Age: 62
End: 2017-11-15

## 2017-11-15 NOTE — TELEPHONE ENCOUNTER
Called pt - she would like to continue with Forteo for now. I will d/c the therapy plan for Prolia.

## 2018-02-03 ENCOUNTER — TRANSFERRED RECORDS (OUTPATIENT)
Dept: HEALTH INFORMATION MANAGEMENT | Facility: CLINIC | Age: 63
End: 2018-02-03

## 2018-02-22 ENCOUNTER — TELEPHONE (OUTPATIENT)
Dept: ENDOCRINOLOGY | Facility: CLINIC | Age: 63
End: 2018-02-22

## 2018-02-22 DIAGNOSIS — M80.00XS AGE-RELATED OSTEOPOROSIS WITH CURRENT PATHOLOGICAL FRACTURE, SEQUELA: Primary | ICD-10-CM

## 2018-05-24 ENCOUNTER — TELEPHONE (OUTPATIENT)
Dept: ENDOCRINOLOGY | Facility: CLINIC | Age: 63
End: 2018-05-24

## 2018-05-24 NOTE — TELEPHONE ENCOUNTER
Called to inform patient of the appointments I coordinated for her on 10/12/18.    She wanted it documented and passed along to Dr Casey that when she had Reclast done a couple years ago, she had a reaction within 24 hours where she proceeded to pass out and that's when she broke her hip.    She's a little anxious about the Prolia injection schedule for October due to not knowing how she'll react. She would like to talk to a nurse or Dr Casey about recommendations on how to handle this.    Thanks.

## 2018-05-24 NOTE — TELEPHONE ENCOUNTER
States when she had the reclast infusion a couple years ago 24 hours after  She had a reaction and passed out breaking her hip. She is anxious about the Prolia injection and how she will respond. Do you have any recommendations for her  When  She receives the Prolia in October? I think it is more tolerated then the Reclast  with less reactions . With Reclast  you need to be well hydrated  and patients tend to have  flu like symptoms  unlike the Prolia.

## 2018-05-25 ENCOUNTER — TELEPHONE (OUTPATIENT)
Dept: ENDOCRINOLOGY | Facility: CLINIC | Age: 63
End: 2018-05-25

## 2018-06-21 ENCOUNTER — TELEPHONE (OUTPATIENT)
Dept: ENDOCRINOLOGY | Facility: CLINIC | Age: 63
End: 2018-06-21

## 2018-06-21 NOTE — TELEPHONE ENCOUNTER
[facility] Call Center    Phone Message    May a detailed message be left on voicemail: yes    Reason for Call: Other: The pt is calling about Forteo. There are a couple issues - the ins co denied it as they state she has taken for over 2 years, so she needs to know when she started the med. Also, she was discussing getting a prior auth started to get the insurance to pay for it. The RX has to [facility] pharmacy. Please call the pt to discuss. Thanks!      Action Taken: Message routed to:  [facility] diab

## 2018-06-21 NOTE — TELEPHONE ENCOUNTER
"teriparatide, recombinant, (FORTEO) 600 MCG/2.4ML SOLN injection 2.4 mL 1 10/20/2017  --   Sig - Route: Inject 0.08 mLs (20 mcg) Subcutaneous daily - Subcutaneous   Class: Historical      10/20/2017 notes state: \"she is currently on Forteo as of September 2016\"    RE:    : yes     Reason for Call: Other: The pt is calling about Forteo. There are a couple issues - the ins co denied it as they state she has taken for over 2 years, so she needs to know when she started the med. Also, she was discussing getting a prior auth started to get the insurance to pay for it. The RX has to [facility] pharmacy. Please call the pt to discuss. Thanks!       Action Taken: Message routed to:  [facility]       Mirna Notes from [facility] Forteo ordered 07/05/2016.   "

## 2018-06-25 NOTE — TELEPHONE ENCOUNTER
[facility] Call Center    Phone Message    May a detailed message be left on voicemail: no    Reason for Call: Other: Pt is returning Diana's call     Action Taken: Message routed to:  [facility] Kel

## 2018-06-27 ENCOUNTER — TELEPHONE (OUTPATIENT)
Dept: ENDOCRINOLOGY | Facility: CLINIC | Age: 63
End: 2018-06-27

## 2018-06-27 NOTE — TELEPHONE ENCOUNTER
Pt states she started using Forteo 09/09/2016, making her completion date 09/09/2018. Insurance states she started 07/05/2016 and is only covering through that date - Need PA for correct completion date as Forteo requires a 2 year administration total for effectiveness.

## 2018-06-28 NOTE — TELEPHONE ENCOUNTER
[facility] Prior Authorization Team   Phone: [phone]  Fax: [phone]    PA Initiation    Medication: Forteo - PA Denied  Insurance Company: [employer] - Phone [phone] Fax [phone]  Pharmacy Filling the Rx: [facility] - [place], [place] - [address]  Filling Pharmacy Phone:    Filling Pharmacy Fax:    Start Date:

## 2018-06-28 NOTE — TELEPHONE ENCOUNTER
[facility] Prior Authorization Team   Phone: [phone]  Fax: [phone]    PRIOR AUTHORIZATION DENIED    Medication: Forteo - PA Denied    Denial Date: 6/8/2018    Denial Rational: Per [facility] [employer] Rep Prior auth has been denied on 6/8/18. An Appeal will need to be sent in for futher review.     Appeal Information: Appeals Team Fax: [phone]     A Letter of Medical necessity with additional chart notes will need to be faxed in    If you would like to appeal this decision, please provide a letter of medical necessity for me to submit to the insurance appeals department for further review.

## 2018-07-03 ENCOUNTER — TELEPHONE (OUTPATIENT)
Dept: ENDOCRINOLOGY | Facility: CLINIC | Age: 63
End: 2018-07-03

## 2018-07-03 NOTE — LETTER
Patient:  Nathalia Syed  :   1955  MRN:     [id number]        Ms.Kathy Annie Syed  [address] [place] [address]        July 3, 2018    To whom it may concern    This letter is on behalf of Nathalia Syed. She will need a full 2 years of Forteo. We anticipate that she will finish her Forteo in 2018 and will then receive Prolia at that time. She's had a history of osteoporosis with fracture and therefore would need the full 2 years of Forteo for maximum benefit. She had a slight delay with initiating her prescription, therefore she did not start to Forteo until 2016. Please let me know if you have any questions.    If you have any questions, please feel free to contact my nurse at [phone] select option #3 for triage nurse.    Regards    Kavitha Casey MD

## 2018-07-05 NOTE — TELEPHONE ENCOUNTER
[facility] Prior Authorization Team   Phone: [phone]  Fax: [phone]    Faxed appeal to insurance

## 2018-07-05 NOTE — TELEPHONE ENCOUNTER
Medication Appeal Request    **Please initiate an appeal for the requested medication: Forteo - PA Denied    Has a letter of medical necessity been completed in EPIC?   Letter is in letters tab of pt's chart (from 7/3/18)    Any additional lab values/information to include? No    Would you like to include any research articles? No              If yes please include the hyperlink(s) below or fax to    [phone] for Specialty/Retail               [phone] for Infusion/Clinic Administered.                Include the patients name and MRN on the fax cover sheet.

## 2018-08-02 ENCOUNTER — TELEPHONE (OUTPATIENT)
Dept: ENDOCRINOLOGY | Facility: CLINIC | Age: 63
End: 2018-08-02

## 2018-08-02 NOTE — LETTER
Patient:  Nathalia Syed  :   1955  MRN:     [id number]        Mrs.Kathy Annie Syed  [address]  [facility] [place] [address]        18    Dear Mr. Delong    This letter is on behalf of Nathalia.  I am one of the endocrinologist working with her. She is now doing better but just needs to avoid risk for falls. She has been very compliant with the treatment program. She has the following issues:     #1 osteoporosis with fracture  This patient has osteoporosis with subsequent fracture.  She suffered multiple fractures and therefore is at high risk for future fractures even though she is complying with treatment.  I would recommend that accommodations be made to reduce her risk for fractures    #2 limitations created by health condition  I would recommend that the patient cannot lift more than 10 pounds as well as precautions  to reduce fall risks.    #3 accommodations adjusting to the work environment or schedule  I would recommend that the patient not be placed at risk for falls. 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 I anticipate that this would only take about 3-5% of her effort as she is able to function within the classroom.  She is otherwise functional and able to work and this very minor assistance (if needed) should not interfere with her ability to perform her job. I will be reassessing her yearly (she has an upcoming appointment).    If you have any questions, please feel free to contact my nurse at [phone] select option #3 for triage nurse.    Regards  Kavitha Casey MD

## 2018-08-02 NOTE — TELEPHONE ENCOUNTER
[facility] Call Center    Phone Message    May a detailed message be left on voicemail: no    Reason for Call: Other: Pt needs another letter sent to her employer from Dr. Casey.  Pt stated you can use the same one as on 08/04/17.  They just need the address updated to the one in the chart and it needs to be signed and pt is a Mrs not a Miss.  Pt would like the letter sent to her, and she would like a call when this has been mailed or if you have further questions     Action Taken: Message routed to:  [facility] Kel

## 2018-10-05 ENCOUNTER — APPOINTMENT (OUTPATIENT)
Dept: LAB | Facility: CLINIC | Age: 63
End: 2018-10-05
Attending: INTERNAL MEDICINE
Payer: COMMERCIAL

## 2018-10-05 ENCOUNTER — INFUSION THERAPY VISIT (OUTPATIENT)
Dept: INFUSION THERAPY | Facility: CLINIC | Age: 63
End: 2018-10-05
Attending: INTERNAL MEDICINE
Payer: COMMERCIAL

## 2018-10-05 ENCOUNTER — RADIANT APPOINTMENT (OUTPATIENT)
Dept: GENERAL RADIOLOGY | Facility: CLINIC | Age: 63
End: 2018-10-05
Attending: FAMILY MEDICINE
Payer: COMMERCIAL

## 2018-10-05 ENCOUNTER — OFFICE VISIT (OUTPATIENT)
Dept: ORTHOPEDICS | Facility: CLINIC | Age: 63
End: 2018-10-05
Payer: COMMERCIAL

## 2018-10-05 ENCOUNTER — OFFICE VISIT (OUTPATIENT)
Dept: ENDOCRINOLOGY | Facility: CLINIC | Age: 63
End: 2018-10-05
Payer: COMMERCIAL

## 2018-10-05 ENCOUNTER — RADIANT APPOINTMENT (OUTPATIENT)
Dept: BONE DENSITY | Facility: CLINIC | Age: 63
End: 2018-10-05
Attending: INTERNAL MEDICINE
Payer: COMMERCIAL

## 2018-10-05 VITALS
DIASTOLIC BLOOD PRESSURE: 84 MMHG | WEIGHT: 141.4 LBS | BODY MASS INDEX: 24.14 KG/M2 | OXYGEN SATURATION: 98 % | SYSTOLIC BLOOD PRESSURE: 147 MMHG | RESPIRATION RATE: 16 BRPM | TEMPERATURE: 98.2 F | HEART RATE: 67 BPM

## 2018-10-05 VITALS
DIASTOLIC BLOOD PRESSURE: 84 MMHG | HEART RATE: 67 BPM | HEIGHT: 65 IN | BODY MASS INDEX: 23.49 KG/M2 | SYSTOLIC BLOOD PRESSURE: 147 MMHG | WEIGHT: 141 LBS

## 2018-10-05 VITALS
HEART RATE: 67 BPM | BODY MASS INDEX: 23.54 KG/M2 | DIASTOLIC BLOOD PRESSURE: 83 MMHG | WEIGHT: 141.31 LBS | HEIGHT: 65 IN | SYSTOLIC BLOOD PRESSURE: 130 MMHG

## 2018-10-05 DIAGNOSIS — M25.551 RIGHT HIP PAIN: Primary | ICD-10-CM

## 2018-10-05 DIAGNOSIS — M25.551 RIGHT HIP PAIN: ICD-10-CM

## 2018-10-05 DIAGNOSIS — M54.2 NECK PAIN: ICD-10-CM

## 2018-10-05 DIAGNOSIS — M81.0 SENILE OSTEOPOROSIS: Primary | ICD-10-CM

## 2018-10-05 DIAGNOSIS — M79.18 MUSCULOSKELETAL PAIN: ICD-10-CM

## 2018-10-05 DIAGNOSIS — M80.00XS AGE-RELATED OSTEOPOROSIS WITH CURRENT PATHOLOGICAL FRACTURE, SEQUELA: ICD-10-CM

## 2018-10-05 DIAGNOSIS — M79.2 RADICULAR PAIN OF RIGHT UPPER EXTREMITY: ICD-10-CM

## 2018-10-05 LAB
ANION GAP SERPL CALCULATED.3IONS-SCNC: 6 MMOL/L (ref 3–14)
BUN SERPL-MCNC: 14 MG/DL (ref 7–30)
CALCIUM SERPL-MCNC: 9.3 MG/DL (ref 8.5–10.1)
CALCIUM SERPL-MCNC: 9.5 MG/DL (ref 8.5–10.1)
CHLORIDE SERPL-SCNC: 106 MMOL/L (ref 94–109)
CK SERPL-CCNC: 114 U/L (ref 30–225)
CO2 SERPL-SCNC: 27 MMOL/L (ref 20–32)
CREAT SERPL-MCNC: 0.73 MG/DL (ref 0.52–1.04)
CREAT SERPL-MCNC: 0.75 MG/DL (ref 0.52–1.04)
ERYTHROCYTE [DISTWIDTH] IN BLOOD BY AUTOMATED COUNT: 12 % (ref 10–15)
ERYTHROCYTE [SEDIMENTATION RATE] IN BLOOD BY WESTERGREN METHOD: 11 MM/H (ref 0–30)
GFR SERPL CREATININE-BSD FRML MDRD: 78 ML/MIN/1.7M2
GFR SERPL CREATININE-BSD FRML MDRD: 81 ML/MIN/1.7M2
GLUCOSE SERPL-MCNC: 85 MG/DL (ref 70–99)
HBA1C MFR BLD: 5.3 % (ref 0–5.6)
HCT VFR BLD AUTO: 44.1 % (ref 35–47)
HGB BLD-MCNC: 14.4 G/DL (ref 11.7–15.7)
MCH RBC QN AUTO: 29.4 PG (ref 26.5–33)
MCHC RBC AUTO-ENTMCNC: 32.7 G/DL (ref 31.5–36.5)
MCV RBC AUTO: 90 FL (ref 78–100)
PHOSPHATE SERPL-MCNC: 3.8 MG/DL (ref 2.5–4.5)
PLATELET # BLD AUTO: 265 10E9/L (ref 150–450)
POTASSIUM SERPL-SCNC: 3.5 MMOL/L (ref 3.4–5.3)
RBC # BLD AUTO: 4.89 10E12/L (ref 3.8–5.2)
SODIUM SERPL-SCNC: 139 MMOL/L (ref 133–144)
T4 FREE SERPL-MCNC: 0.87 NG/DL (ref 0.76–1.46)
TSH SERPL DL<=0.005 MIU/L-ACNC: 0.77 MU/L (ref 0.4–4)
VIT B12 SERPL-MCNC: 778 PG/ML (ref 193–986)
WBC # BLD AUTO: 7.5 10E9/L (ref 4–11)

## 2018-10-05 PROCEDURE — 36415 COLL VENOUS BLD VENIPUNCTURE: CPT

## 2018-10-05 PROCEDURE — 85652 RBC SED RATE AUTOMATED: CPT | Performed by: INTERNAL MEDICINE

## 2018-10-05 PROCEDURE — 85027 COMPLETE CBC AUTOMATED: CPT | Performed by: INTERNAL MEDICINE

## 2018-10-05 PROCEDURE — 25000128 H RX IP 250 OP 636: Mod: ZF | Performed by: INTERNAL MEDICINE

## 2018-10-05 PROCEDURE — 82310 ASSAY OF CALCIUM: CPT | Performed by: INTERNAL MEDICINE

## 2018-10-05 PROCEDURE — 00000402 ZZHCL STATISTIC TOTAL PROTEIN: Performed by: INTERNAL MEDICINE

## 2018-10-05 PROCEDURE — 84443 ASSAY THYROID STIM HORMONE: CPT | Performed by: INTERNAL MEDICINE

## 2018-10-05 PROCEDURE — 80048 BASIC METABOLIC PNL TOTAL CA: CPT | Performed by: INTERNAL MEDICINE

## 2018-10-05 PROCEDURE — 82607 VITAMIN B-12: CPT | Performed by: INTERNAL MEDICINE

## 2018-10-05 PROCEDURE — 84165 PROTEIN E-PHORESIS SERUM: CPT | Performed by: INTERNAL MEDICINE

## 2018-10-05 PROCEDURE — 84100 ASSAY OF PHOSPHORUS: CPT | Performed by: INTERNAL MEDICINE

## 2018-10-05 PROCEDURE — 82306 VITAMIN D 25 HYDROXY: CPT | Performed by: INTERNAL MEDICINE

## 2018-10-05 PROCEDURE — 96372 THER/PROPH/DIAG INJ SC/IM: CPT

## 2018-10-05 PROCEDURE — 80061 LIPID PANEL: CPT | Performed by: INTERNAL MEDICINE

## 2018-10-05 PROCEDURE — 83036 HEMOGLOBIN GLYCOSYLATED A1C: CPT | Performed by: INTERNAL MEDICINE

## 2018-10-05 PROCEDURE — 82565 ASSAY OF CREATININE: CPT | Performed by: INTERNAL MEDICINE

## 2018-10-05 PROCEDURE — 82550 ASSAY OF CK (CPK): CPT | Performed by: INTERNAL MEDICINE

## 2018-10-05 PROCEDURE — 84439 ASSAY OF FREE THYROXINE: CPT | Performed by: INTERNAL MEDICINE

## 2018-10-05 RX ADMIN — DENOSUMAB 60 MG: 60 INJECTION SUBCUTANEOUS at 13:34

## 2018-10-05 ASSESSMENT — PAIN SCALES - GENERAL
PAINLEVEL: MODERATE PAIN (4)

## 2018-10-05 NOTE — PATIENT INSTRUCTIONS
Denosumab Solution for injection  What is this medicine?  DENOSUMAB (den oh lauren mab) slows bone breakdown. Prolia is used to treat osteoporosis in women after menopause and in men. Xgeva is used to prevent bone fractures and other bone problems caused by cancer bone metastases. Xgeva is also used to treat giant cell tumor of the bone.  This medicine may be used for other purposes; ask your health care provider or pharmacist if you have questions.  What should I tell my health care provider before I take this medicine?  They need to know if you have any of these conditions:    dental disease    eczema    infection or history of infections    kidney disease or on dialysis    low blood calcium or vitamin D    malabsorption syndrome    scheduled to have surgery or tooth extraction    taking medicine that contains denosumab    thyroid or parathyroid disease    an unusual reaction to denosumab, other medicines, foods, dyes, or preservatives    pregnant or trying to get pregnant    breast-feeding  How should I use this medicine?  This medicine is for injection under the skin. It is given by a health care professional in a hospital or clinic setting.  If you are getting Prolia, a special MedGuide will be given to you by the pharmacist with each prescription and refill. Be sure to read this information carefully each time.  For Prolia, talk to your pediatrician regarding the use of this medicine in children. Special care may be needed. For Xgeva, talk to your pediatrician regarding the use of this medicine in children. While this drug may be prescribed for children as young as 13 years for selected conditions, precautions do apply.  Overdosage: If you think you've taken too much of this medicine contact a poison control center or emergency room at once.  NOTE: This medicine is only for you. Do not share this medicine with others.  What if I miss a dose?  It is important not to miss your dose. Call your doctor or health  care professional if you are unable to keep an appointment.  What may interact with this medicine?  Do not take this medicine with any of the following medications:    other medicines containing denosumab  This medicine may also interact with the following medications:    medicines that suppress the immune system    medicines that treat cancer    steroid medicines like prednisone or cortisone  This list may not describe all possible interactions. Give your health care provider a list of all the medicines, herbs, non-prescription drugs, or dietary supplements you use. Also tell them if you smoke, drink alcohol, or use illegal drugs. Some items may interact with your medicine.  What should I watch for while using this medicine?  Visit your doctor or health care professional for regular checks on your progress. Your doctor or health care professional may order blood tests and other tests to see how you are doing.  Call your doctor or health care professional if you get a cold or other infection while receiving this medicine. Do not treat yourself. This medicine may decrease your body's ability to fight infection.  You should make sure you get enough calcium and vitamin D while you are taking this medicine, unless your doctor tells you not to. Discuss the foods you eat and the vitamins you take with your health care professional.  See your dentist regularly. Brush and floss your teeth as directed. Before you have any dental work done, tell your dentist you are receiving this medicine.  Do not become pregnant while taking this medicine or for 5 months after stopping it. Women should inform their doctor if they wish to become pregnant or think they might be pregnant. There is a potential for serious side effects to an unborn child. Talk to your health care professional or pharmacist for more information.  What side effects may I notice from receiving this medicine?  Side effects that you should report to your doctor or  health care professional as soon as possible:    allergic reactions like skin rash, itching or hives, swelling of the face, lips, or tongue    breathing problems    chest pain    fast, irregular heartbeat    feeling faint or lightheaded, falls    fever, chills, or any other sign of infection    muscle spasms, tightening, or twitches    numbness or tingling    skin blisters or bumps, or is dry, peels, or red    slow healing or unexplained pain in the mouth or jaw    unusual bleeding or bruising  Side effects that usually do not require medical attention (Report these to your doctor or health care professional if they continue or are bothersome.):    muscle pain    stomach upset, gas  This list may not describe all possible side effects. Call your doctor for medical advice about side effects. You may report side effects to FDA at 3-562-FDA-6922.  Where should I keep my medicine?  This medicine is only given in a clinic, doctor's office, or other health care setting and will not be stored at home.  NOTE: This sheet is a summary. It may not cover all possible information. If you have questions about this medicine, talk to your doctor, pharmacist, or health care provider.  NOTE:This sheet is a summary. It may not cover all possible information. If you have questions about this medicine, talk to your doctor, pharmacist, or health care provider. Copyright  2016 Gold Standard

## 2018-10-05 NOTE — PROGRESS NOTES
"      SPORTS & ORTHOPEDIC WALK-IN VISIT 10/5/2018    Primary Care Physician: Dr. Nicholson    Most pain in R hip. Neck and back pain. When vacuums, certain motions states \"deep bone pain\" along R arm.   Concerned about fx in R hip, as has had a L hip fx in 2015. R hip has been bothersome.     Reason for visit:     What part of your body is injured / painful?  right hip, neck,     What caused the injury /pain? No inciting event     How long ago did your injury occur or pain begin? About a year    What are your most bothersome symptoms? Pain    How would you characterize your symptom?  sharp and shooting    What makes your symptoms better? Rest    What makes your symptoms worse? Standing and Walking    Have you been previously seen for this problem? Yes, DR. Casey    Medical History:    Any recent changes to your medical history? No    Any new medication prescribed since last visit? No    Have you had surgery on this body part before? No    Social History:    Occupation: Teacher    Handedness: Right    Exercise: 1-2 days/week    Review of Systems:    Do you have fever, chills, weight loss? No    Do you have any vision problems? No    Do you have any chest pain or edema? No    Do you have any shortness of breath or wheezing?  No    Do you have stomach problems? No    Do you have any numbness or focal weakness? No    Do you have diabetes? No    Do you have problems with bleeding or clotting? No    Do you have an rashes or other skin lesions? No           "

## 2018-10-05 NOTE — PROGRESS NOTES
[facility]  Orthopedics  Inder Patton,   10/05/2018     Name: Nathalia Syed  MRN: [id number]  Age: 63 year old  : 1955  Referring provider: Kavitha Casey     Chief Complaint: Pain of the Right Hip and Pain of the Neck     Date of Injury: Hip and neck: approximately 1 year    History of Present Illness:   Nathalia Syed is a 63 year old, female with a history of severe osteoperosis who presents today for evaluation of right hip and neck pain. In regard to her left hip, the patient reports fracturing her left hip after a fall in . She subsequently got pins inserted into her left hip. She has not had any problems with her left hip since the pins were put in. In regard to her right hip, she experiences occasional sharp pain in the lateral aspect of her hip that shoots across her pelvis. This pain onsets about once a month while she is casually walking. The pain subsides after laying down or when she stops walking.     In regard to her neck, the patient has a history of right hand fifth digit breaks that has causes some issues in her right hand. She has been experiencing frequent deep bone pain in her right humerus over the past year. She also experiences pain in the middle of her back. She describes her back pain as burning in nature. Her arm pain is shooting in nature that radiates from her wrist all the way up her arm. Her arm pain is exacerbated with activities such as vacuuming. She occasionally drops things in her right hand due to weakness. She voices no further concerns at this time.     Review of Systems:   A 10-point review of systems was obtained and is negative except for as noted in the HPI.     Medications:   Current Outpatient Prescriptions:      ASPIRIN PO, Take 81 mg by mouth, Disp: , Rfl:      calcium carbonate (OS-MEGHAN 500 MG [place]. [place]) 500 MG tablet, Take 1,000 mg by mouth 2 times daily, Disp: , Rfl:      Famciclovir (FAMVIR PO), Take 500 mg by mouth As needed, Disp: , Rfl:  "     MAGNESIUM OXIDE PO, Take 250 mg by mouth, Disp: , Rfl:      Omega-3 Fatty Acids (FISH OIL PO), , Disp: , Rfl:      POTASSIUM CITRATE PO, , Disp: , Rfl:      VITAMIN D, CHOLECALCIFEROL, PO, Take 2,000 Units by mouth daily, Disp: , Rfl:   No current facility-administered medications for this visit.     Allergies:  Allergies   Allergen Reactions     Tramadol Itching and Rash     Reclast [Zoledronic Acid]      \"Pt passed out\"     Aspirin Rash     stomach     Codeine Rash and GI Disturbance     Rofecoxib Rash     Past Medical History:  No past medical history on file.    Past Surgical History:  No past surgical history on file.     Social History:  Patient exercises 1-2 days a week. Works as a teacher.     Social History     Social History     Marital status:      Spouse name: N/A     Number of children: N/A     Years of education: N/A     Social History Main Topics     Smoking status: Never Smoker     Smokeless tobacco: Never Used     Alcohol use Not on file     Drug use: Not on file     Sexual activity: Not on file     Other Topics Concern     Not on file     Social History Narrative     Family History:  No family history on file.    Physical Examination:  Blood pressure 147/84, pulse 67, height 1.64 m (5' 4.57\"), weight 64 kg (141 lb).  General  - normal appearance, in no obvious distress  HEENT  -Pupils equal, round, no conjunctival injection.  No lid lag  CV  - normal femoral pulse  Pulm  - normal respiratory pattern, non-labored  Musculoskeletal - right hip  - stance: normal gait without limp, normal single leg squat, no obvious leg length discrepancy, normal heel and toe walk  - inspection: no swelling or effusion,  normal bone and joint alignment, no obvious deformity  - palpation: no lateral or anterior hip tenderness  - ROM: 120 degrees of flexion, otherwise normal extension, IR, ER, abduction, adduction, not painful, no crepitus  - strength: 5/5 in all planes  - special tests:  (-) HALEY  (-) " FADIR  no pain with axial femoral load  Musculoskeletal - cervical spine  - inspection: normal bone and joint alignment, no obvious kyphosis  - palpation: no paravertebral or bony tenderness. No tenderness to palpation of the cervical paraspinal. Tenderness with ROM   - ROM: normal and painless flexion, extension, left and right sidebending and rotation.   - strength: upper extremities 5/5 in all planes  - special tests:  (-) Spurling bilaterally  (-) Cleveland's reflex  Neuro  - C5-7 DTRs 2+ bilaterally, no sensory or motor deficit, grossly normal coordination, normal muscle tone  Skin  - no ecchymosis, erythema, warmth, or induration, no obvious rash  Psych  - interactive, appropriate, normal mood and affect    Imaging:   Radiographs of the cervical spine - 2/3 views (10/05/2018)  degenerative changes seen at the C5 - C6.     Radiographs of the right hip - 2/3 views (10/05/2018)  Stable appearing post surgical screw with left femoral neck right hip with no acute osseus abnormalities. Minimal deg changes at hip joint.     I have independently reviewed the above imaging studies; the results were discussed with the patient.     Assessment:   63 year old, female with past medial history of osteoperosis as well as left hip fracture presenting with right intermittent hip pain and additional right arm pain. No evidence of significant degenerative changes or fracture on right hip x-ray. Right arm pain is most likely radicular in nature. Will obtain an MRI to pursue definitve treatment.     Diagnosis:   1. Chronic intermittent right hip pain  2. Radicular pain of the right upper and lower arm.     Plan:   1. Patient will obtain an MRI of the cervical spine in [place]   2. In regard to the hip, I prescribed at home exercises for strengthening   3. XR hip unremarkable for any acute or chronic changes  4. I will call the patient to discuss the results of the MRI and further treatment options     I, Inder Patton DO, have  reviewed the above note and agree with the scribe's notation as written.    Scribe Disclosure:   I, Festus Keys, am serving as a scribe to document services personally performed by Inder Patton DO at this visit, based upon the provider's statements to me. All documentation has been reviewed by the aforementioned provider prior to being entered into the official medical record.

## 2018-10-05 NOTE — MR AVS SNAPSHOT
"              After Visit Summary   10/5/2018    Nathalia Syed    MRN: [id number]           Patient Information     Date Of Birth          1955        Visit Information        Provider Department      10/5/2018 12:00 PM Kavitha Casey MD [facility]        Today's Diagnoses     Senile osteoporosis    -  1    Musculoskeletal pain           Follow-ups after your visit        Follow-up notes from your care team     Return in about 1 year (around 10/5/2019).      Future tests that were ordered for you today     Open Future Orders        Priority Expected Expires Ordered    MRI Cervical spine w/o contrast Routine  10/5/2019 10/5/2018            Who to contact     Please call your clinic at [phone] to:    Ask questions about your health    Make or cancel appointments    Discuss your medicines    Learn about your test results    Speak to your doctor            Additional Information About Your Visit        [employer] Information     [employer] gives you secure access to your electronic health record. If you see a primary care provider, you can also send messages to your care team and make appointments. If you have questions, please call your primary care clinic.  If you do not have a primary care provider, please call [phone] and they will assist you.      [employer] is an electronic gateway that provides easy, online access to your medical records. With [employer], you can request a clinic appointment, read your test results, renew a prescription or communicate with your care team.     To access your existing account, please contact your [facility] or call [phone] for assistance.        Care EveryWhere ID     This is your Care EveryWhere ID. This could be used by other organizations to access your [facility] medical records  [id number]        Your Vitals Were     Pulse Height BMI (Body Mass Index)             67 1.64 m (5' 4.57\") 23.83 kg/m2          Blood " Pressure from Last 3 Encounters:   10/05/18 147/84   10/05/18 130/83   10/20/17 146/81    Weight from Last 3 Encounters:   10/05/18 64.1 kg (141 lb 6.4 oz)   10/05/18 64.1 kg (141 lb 5 oz)               Primary Care Provider Office Phone # Fax #    Inder Nicholson [phone]       [facility] [address] [facility] [address]        Equal Access to Services     MARITZA SHORT : Hadii aad ku hadasho Soomaali, waaxda luqadaha, qaybta kaalmada adeegyada, waxay idiin hayaan rai jj . So [facility] [phone].    ATENCIÓN: Si habla español, tiene a samayoa disposición servicios gratuitos de asistencia lingüística. Ll[facility] [phone].    We comply with applicable federal civil rights laws and [place] laws. We do not discriminate on the basis of race, color, national origin, age, disability, sex, sexual orientation, or gender identity.            Thank you!     Thank you for choosing [facility]  for your care. Our goal is always to provide you with excellent care. Hearing back from our patients is one way we can continue to improve our services. Please take a few minutes to complete the written survey that you may receive in the mail after your visit with us. Thank you!             Your Updated Medication List - Protect others around you: Learn how to safely use, store and throw away your medicines at www.disposemymeds.org.          This list is accurate as of 10/5/18  3:02 PM.  Always use your most recent med list.                   Brand Name Dispense Instructions for use Diagnosis    ASPIRIN PO      Take 81 mg by mouth        calcium carbonate 500 mg (elemental) 500 MG tablet    OS-MEGHAN     Take 1,000 mg by mouth 2 times daily        FAMVIR PO      Take 500 mg by mouth As needed        FISH OIL PO           MAGNESIUM OXIDE PO      Take 250 mg by mouth        POTASSIUM CITRATE PO           VITAMIN D (CHOLECALCIFEROL) PO      Take 2,000 Units by mouth daily

## 2018-10-05 NOTE — PROGRESS NOTES
[facility]  Endocrinology  Kavitha Casey MD  10/05/2018      Chief Complaint:   RECHECK     History of Present Illness:   Nathalia Syed is a 63 year old female who presents for follow up on osteoporosis.    #1 osteoporosis s/p 2 years of Forteo ending in 2018 and start Prolia in 2018. Pt is intolerant of Reclast.   Patient was initially seen in  and noted to have osteoporosis. She underwent menopause in  and has not been on hormone replacement therapy. Patient started Forteo .    Interval history: Her work has been accommodating for her. She has used a handicap parking permit which is about to  and would like this renewed.  Last Forteo injection was 2018.  Patient is pending her first dose of Reclast today.  2018 DEXA scan formal report is pending but this shows a T score of -3.6 at the lumbar spine and a Z score -2.3 at the lumbar spine.  Of note, she remains on a handicap disability sticker due to her low bone density, history of hip fracture, and unsteady gait.  She does not anticipate any upcoming dental extractions.     #2 hypertension:   In 2017 patient noted increasing blood pressures.    #3 hypertriglycerides:   Noted in 2017. Not discussed today.     #4 headaches and dizziness:   Since 2017, the patient reports a history of headaches, eye pain, and dizziness.  She had an EEG in 2017 which was unremarkable.  There was no clear exacerbating or relieving factors.  She reports that the intermittent head pain can be bilateral.  She's never had a head MRI.  She is uncertain whether her symptoms are necessarily related to her Forteo, although she was on the Forteo without symptoms for roughly 3 months.    Interval history: still has some headaches but has not had any severe headaches.     #5 anxiety:   Patient started on Cymbalta in 2017. She did not find this helpful. Her blood pressure has been in the systolic 120-130 range.     #6   "Musculoskeletal concerns  For the past year, she reports pain on her right hip that shoots across the front of her pelvis.  This last for several minutes and inhibits her ability to walk.  This happens roughly once per month.  This resolves with cessation of movement.  This does not happen at night.  In addition, she also reports pain in her hands and fingers (right side close (which shoot upwards.)    Review of Systems:   Pertinent items are noted in HPI, remainder of complete ROS is negative.      Active Medications:     Current Outpatient Prescriptions:      ASPIRIN PO, Take 81 mg by mouth, Disp: , Rfl:      calcium carbonate (OS-MEGHAN 500 MG [place]. [place]) 500 MG tablet, Take 1,000 mg by mouth 2 times daily, Disp: , Rfl:      MAGNESIUM OXIDE PO, Take 250 mg by mouth, Disp: , Rfl:      Omega-3 Fatty Acids (FISH OIL PO), , Disp: , Rfl:      POTASSIUM CITRATE PO, , Disp: , Rfl:      VITAMIN D, CHOLECALCIFEROL, PO, Take 2,000 Units by mouth daily, Disp: , Rfl:      Famciclovir (FAMVIR PO), Take 500 mg by mouth As needed, Disp: , Rfl:   No current facility-administered medications for this visit.       Allergies:   Tramadol; Reclast [zoledronic acid]; Aspirin; Codeine; and Rofecoxib      Past Medical History:  Senile osteoporosis   Difficulty swallowing     Past Surgical History:  History reviewed. No pertinent surgical history.    Family History:   History reviewed. No pertinent family history.      Social History:     Non smoker     Physical Exam:   /83  Pulse 67  Ht 1.64 m (5' 4.57\")  Wt 64.1 kg (141 lb 5 oz)  BMI 23.83 kg/m2   GENERAL APPEARANCE: Alert and no distress  NECK: No lymphadenopathy appreciated  Thyroid: No obvious nodules palpated   CV: RRR without M/R/G  Lungs: CTA bilaterally  Abdomen: Soft, Nontender, non distended, positive bowel sounds   Neuro:no focal deficits  Skin: No infection in feet   Mood: Normal   Lymph: neg in neck and supraclavicular area  MSK:   Hips - No pain to internal or " external rotation    Data:    Assessment and Plan:  #1 Senile osteoporosis  The patient has received 2 years of Forteo (ending in 2018) of the.  She does not tolerate Reclast.  She is scheduled for her first dose of Prolia in October 2018.  We will call the patient next week.  If she tolerates the Prolia, she will plan repeating the Prolia injection in 6 months locally.    - 25 Hydroxyvitamin D2 and D3  - Basic metabolic panel  - Protein electrophoresis  - Vitamin B12  - Hemoglobin A1c  - TSH  - T4 free  - CK total  - Erythrocyte sedimentation rate auto  - CBC with platelets      #2 hypertension:   Blood pressure reasonable today.  Patient to follow-up with primary provider.    #3 hypertriglycerides:   Not discussed today.    #4 headaches and dizziness:   Reports symptoms have generally improved    #5 anxiety:   Not discussed today.  Patient no longer on Cymbalta.    #6 musculoskeletal concerns  Labs as below. She will see the orthopedics walk in clinic today.   - 25 Hydroxyvitamin D2 and D3  - Basic metabolic panel  - Protein electrophoresis  - Vitamin B12  - Hemoglobin A1c  - TSH  - T4 free  - CK total  - Erythrocyte sedimentation rate auto  - CBC with platelets       Follow-up: Return in about 1 year (around 10/5/2019).         Scribe Disclosure:   I, Jagjit Fiore, am serving as a scribe to document services personally performed by Kavitha Casey MD at this visit, based upon the provider's statements to me. All documentation has been reviewed by the aforementioned provider prior to being entered into the official medical record.     Portions of this medical record were completed by a scribe. UPON MY REVIEW AND AUTHENTICATION BY ELECTRONIC SIGNATURE, this confirms (a) I performed the applicable clinical services, and (b) the record is accurate.

## 2018-10-05 NOTE — NURSING NOTE
Chief Complaint   Patient presents with     Blood Draw     labs drawn by venipuncture by rn.  vs taken.     Labs drawn by venipuncture by rn.  Vital signs taken.  Pt checked in to next appointment.  Rhoda Mckeon RN

## 2018-10-05 NOTE — MR AVS SNAPSHOT
"              After Visit Summary   10/5/2018    Nathalia Syed    MRN: [id number]           Patient Information     Date Of Birth          1955        Visit Information        Provider Department      10/5/2018 2:00 PM Inder Patton DO [facility]        Today's Diagnoses     Right hip pain    -  1    Neck pain        Radicular pain of right upper extremity           Follow-ups after your visit        Future tests that were ordered for you today     Open Future Orders        Priority Expected Expires Ordered    MRI Cervical spine w/o contrast Routine  10/5/2019 10/5/2018            Who to contact     Please call your clinic at [phone] to:    Ask questions about your health    Make or cancel appointments    Discuss your medicines    Learn about your test results    Speak to your doctor            Additional Information About Your Visit        [employer] Information     [employer] gives you secure access to your electronic health record. If you see a primary care provider, you can also send messages to your care team and make appointments. If you have questions, please call your primary care clinic.  If you do not have a primary care provider, please call [phone] and they will assist you.      [employer] is an electronic gateway that provides easy, online access to your medical records. With [employer], you can request a clinic appointment, read your test results, renew a prescription or communicate with your care team.     To access your existing account, please contact your [facility] or call [phone] for assistance.        Care EveryWhere ID     This is your Care EveryWhere ID. This could be used by other organizations to access your [place] medical records  [id number]        Your Vitals Were     Pulse Height BMI (Body Mass Index)             67 1.64 m (5' 4.57\") 23.78 kg/m2          Blood Pressure from Last 3 Encounters:   10/05/18 " 147/84   10/05/18 147/84   10/05/18 130/83    Weight from Last 3 Encounters:   10/05/18 64 kg (141 lb)   10/05/18 64.1 kg (141 lb 6.4 oz)   10/05/18 64.1 kg (141 lb 5 oz)               Primary Care Provider Office Phone # Fax #    Inder Nicholson [phone]       [facility] [address] [facility] [address]        Equal Access to Services     MARITZA SHORT : Hadii aad ku hadasho Soomaali, waaxda luqadaha, qaybta kaalmada adeegyada, waxay idiin hayaan adericky jj . So [facility] [phone].    ATENCIÓN: Si habla español, tiene a samayoa disposición servicios gratuitos de asistencia lingüística. [facility] [phone].    We comply with applicable federal civil rights laws and [place] laws. We do not discriminate on the basis of race, color, national origin, age, disability, sex, sexual orientation, or gender identity.            Thank you!     Thank you for choosing [facility] SPOR[facility] AND ORTHOPAED[facility] WALK IN CLINIC  for your care. Our goal is always to provide you with excellent care. Hearing back from our patients is one way we can continue to improve our services. Please take a few minutes to complete the written survey that you may receive in the mail after your visit with us. Thank you!             Your Updated Medication List - Protect others around you: Learn how to safely use, store and throw away your medicines at www.disposemymeds.org.          This list is accurate as of 10/5/18  5:13 PM.  Always use your most recent med list.                   Brand Name Dispense Instructions for use Diagnosis    ASPIRIN PO      Take 81 mg by mouth        calcium carbonate 500 mg (elemental) 500 MG tablet    OS-MEGHAN     Take 1,000 mg by mouth 2 times daily        FAMVIR PO      Take 500 mg by mouth As needed        FISH OIL PO           MAGNESIUM OXIDE PO      Take 250 mg by mouth        POTASSIUM CITRATE PO           VITAMIN D (CHOLECALCIFEROL) PO      Take 2,000 Units by mouth daily

## 2018-10-05 NOTE — LETTER
"10/5/2018       RE: Nathalia Syde  [address]  [facility] [address]     Dear Colleague,    Thank you for referring your patient, Nathalia Syed, to the [facility] SPORTS AND ORTHOPAEDIC WALK IN CLINIC at [facility]. Please see a copy of my visit note below.          SPORTS & ORTHOPEDIC WALK-IN VISIT 10/5/2018    Primary Care Physician: Dr. Nicholson    Most pain in R hip. Neck and back pain. When vacuums, certain motions states \"deep bone pain\" along R arm.   Concerned about fx in R hip, as has had a L hip fx in 2015. R hip has been bothersome.     Reason for visit:     What part of your body is injured / painful?  right hip, neck,     What caused the injury /pain? No inciting event     How long ago did your injury occur or pain begin? About a year    What are your most bothersome symptoms? Pain    How would you characterize your symptom?  sharp and shooting    What makes your symptoms better? Rest    What makes your symptoms worse? Standing and Walking    Have you been previously seen for this problem? Yes, DR. Casey    Medical History:    Any recent changes to your medical history? No    Any new medication prescribed since last visit? No    Have you had surgery on this body part before? No    Social History:    Occupation: Teacher    Handedness: Right    Exercise: 1-2 days/week    Review of Systems:    Do you have fever, chills, weight loss? No    Do you have any vision problems? No    Do you have any chest pain or edema? No    Do you have any shortness of breath or wheezing?  No    Do you have stomach problems? No    Do you have any numbness or focal weakness? No    Do you have diabetes? No    Do you have problems with bleeding or clotting? No    Do you have an rashes or other skin lesions? No             [facility]  Orthopedics  Inder Patton, DO  10/05/2018     Name: Nathalia Syed  MRN: [id number]  Age: 63 year old  : 1955  Referring provider: Kavitha" Mag Casey     Chief Complaint: Pain of the Right Hip and Pain of the Neck     Date of Injury: Hip and neck: approximately 1 year    History of Present Illness:   Nathalia Syed is a 63 year old, female with a history of severe osteoperosis who presents today for evaluation of right hip and neck pain. In regard to her left hip, the patient reports fracturing her left hip after a fall in 2015. She subsequently got pins inserted into her left hip. She has not had any problems with her left hip since the pins were put in. In regard to her right hip, she experiences occasional sharp pain in the lateral aspect of her hip that shoots across her pelvis. This pain onsets about once a month while she is casually walking. The pain subsides after laying down or when she stops walking.     In regard to her neck, the patient has a history of right hand fifth digit breaks that has causes some issues in her right hand. She has been experiencing frequent deep bone pain in her right humerus over the past year. She also experiences pain in the middle of her back. She describes her back pain as burning in nature. Her arm pain is shooting in nature that radiates from her wrist all the way up her arm. Her arm pain is exacerbated with activities such as vacuuming. She occasionally drops things in her right hand due to weakness. She voices no further concerns at this time.     Review of Systems:   A 10-point review of systems was obtained and is negative except for as noted in the HPI.     Medications:   Current Outpatient Prescriptions:      ASPIRIN PO, Take 81 mg by mouth, Disp: , Rfl:      calcium carbonate (OS-MEGHAN 500 MG [place]. [place]) 500 MG tablet, Take 1,000 mg by mouth 2 times daily, Disp: , Rfl:      Famciclovir (FAMVIR PO), Take 500 mg by mouth As needed, Disp: , Rfl:      MAGNESIUM OXIDE PO, Take 250 mg by mouth, Disp: , Rfl:      Omega-3 Fatty Acids (FISH OIL PO), , Disp: , Rfl:      POTASSIUM CITRATE PO, , Disp: , Rfl:       "VITAMIN D, CHOLECALCIFEROL, PO, Take 2,000 Units by mouth daily, Disp: , Rfl:   No current facility-administered medications for this visit.     Allergies:  Allergies   Allergen Reactions     Tramadol Itching and Rash     Reclast [Zoledronic Acid]      \"Pt passed out\"     Aspirin Rash     stomach     Codeine Rash and GI Disturbance     Rofecoxib Rash     Past Medical History:  No past medical history on file.    Past Surgical History:  No past surgical history on file.     Social History:  Patient exercises 1-2 days a week. Works as a teacher.     Social History     Social History     Marital status:      Spouse name: N/A     Number of children: N/A     Years of education: N/A     Social History Main Topics     Smoking status: Never Smoker     Smokeless tobacco: Never Used     Alcohol use Not on file     Drug use: Not on file     Sexual activity: Not on file     Other Topics Concern     Not on file     Social History Narrative     Family History:  No family history on file.    Physical Examination:  Blood pressure 147/84, pulse 67, height 1.64 m (5' 4.57\"), weight 64 kg (141 lb).  General  - normal appearance, in no obvious distress  HEENT  -Pupils equal, round, no conjunctival injection.  No lid lag  CV  - normal femoral pulse  Pulm  - normal respiratory pattern, non-labored  Musculoskeletal - right hip  - stance: normal gait without limp, normal single leg squat, no obvious leg length discrepancy, normal heel and toe walk  - inspection: no swelling or effusion,  normal bone and joint alignment, no obvious deformity  - palpation: no lateral or anterior hip tenderness  - ROM: 120 degrees of flexion, otherwise normal extension, IR, ER, abduction, adduction, not painful, no crepitus  - strength: 5/5 in all planes  - special tests:  (-) HALEY  (-) FADIR  no pain with axial femoral load  Musculoskeletal - cervical spine  - inspection: normal bone and joint alignment, no obvious kyphosis  - palpation: no " paravertebral or bony tenderness. No tenderness to palpation of the cervical paraspinal. Tenderness with ROM   - ROM: normal and painless flexion, extension, left and right sidebending and rotation.   - strength: upper extremities 5/5 in all planes  - special tests:  (-) Spurling bilaterally  (-) Cleveland's reflex  Neuro  - C5-7 DTRs 2+ bilaterally, no sensory or motor deficit, grossly normal coordination, normal muscle tone  Skin  - no ecchymosis, erythema, warmth, or induration, no obvious rash  Psych  - interactive, appropriate, normal mood and affect    Imaging:   Radiographs of the cervical spine - 2/3 views (10/05/2018)  degenerative changes seen at the C5 - C6.     Radiographs of the right hip - 2/3 views (10/05/2018)  Stable appearing post surgical screw with left femoral neck right hip with no acute osseus abnormalities. Minimal deg changes at hip joint.     I have independently reviewed the above imaging studies; the results were discussed with the patient.     Assessment:   63 year old, female with past medial history of osteoperosis as well as left hip fracture presenting with right intermittent hip pain and additional right arm pain. No evidence of significant degenerative changes or fracture on right hip x-ray. Right arm pain is most likely radicular in nature. Will obtain an MRI to pursue definitve treatment.     Diagnosis:   4. Chronic intermittent right hip pain  5. Radicular pain of the right upper and lower arm.     Plan:   9. Patient will obtain an MRI of the cervical spine in [place]   10. In regard to the hip, I prescribed at home exercises for strengthening   11. XR hip unremarkable for any acute or chronic changes  12. I will call the patient to discuss the results of the MRI and further treatment options     I, Inder Patton DO, have reviewed the above note and agree with the scribe's notation as written.    Scribe Disclosure:   I, Festus Keys, am serving as a scribe to document services  personally performed by Inder Patton DO at this visit, based upon the provider's statements to me. All documentation has been reviewed by the aforementioned provider prior to being entered into the official medical record.    Again, thank you for allowing me to participate in the care of your patient.      Sincerely,    Inder Patton DO

## 2018-10-05 NOTE — LETTER
Patient:  Nathalia Fraire  :   1955  MRN:     [id number]        Ms.Kathy Annie Fraire  [address]  [place] [address]        2018    Dear Nathalia    Here is your bone density which shows osteoporosis that we knew about. I would like to see you next year to repeat this scan to make sure that the Prolia is working. You should get your next prolia shot around 2019 from Dr. Nicholson's office. I have sent a letter with my notes, but please touch base with him to confirm.    If you have any questions, please feel free to contact my nurse at [phone] select option #3 for triage nurse  or  option #1 for scheduling related questions.    Regards    Kavitha Seymour MD    Resulted Orders   Dexa hip/pelvis/spine    Narrative    [facility]   [address] [place] [address]  Phone: [phone]   Fax: [phone]        Patient name:   NATHALIA FRAIRE ([id number] )  Patient demographics:  [age] year [age] White Female of 64.0 in. height and   147.0 lbs. weight  Ordering provider:  KAVITHA SEYMOUR  History:  EVAL BONE DENSITY, family hx of osteoporosis, left hip hardware,   POSTMENOPAUSAL status  Current treatments:  Calcium, PTH-1-34 / Forteo injections, Vitamin D  Scan:    DXA exam ((not specified) ): GnuBIOigy  Exam date:   10/05/2018     Dual energy x-ray absorptiometry results:     Region BMD T - score Z - score   L1-L4 0.750 g/cm  -3.6 -2.3         Right Neck 0.735 g/cm  -2.2 -0.9   Right Total 0.706 g/cm  -2.4 -1.4     Conclusions:  The most negative and valid T-score of -3.6  at the level of the lumbar   spine, corresponds with osteoporosis.     The risk of osteoporotic fracture increases approximately 2-fold for each   1.0 SD decrease in T-score.  Low bone density is not the only risk factor   for fracture;  consider factors such as patient's age, fall risk, injury   risk, previous osteoporotic fracture,  family history of osteoporosis, etc.    People with elevated risk of fracture should be regularly evaluated for   low bone mineral density.  For patients eligible for Medicare, routine   testing is allowed once every 2 years.  Testing frequency can be increased   for patients on corticosteroids.  Clinical correlation is recommended.      Bone densitometry cannot rule out secondary causes of bone loss.   Therefore, further metabolic testing to look for secondary causes of low   BMD should be performed if indicated. Clinical correlation is recommended     Lateral spine imaging with standard radiography or densitometric Vertebral   Fracture Assessment (VFA) may be performed when T-score is < -1.0 AND   -historical height loss > 4 cm (>1.5 inches); or   -glucocorticoid therapy of prednisone ? 5 mg/day or equivalent for ? 3   months is present.    Clinical correlation is strongly recommended      Feel free to contact DXA services if you have any questions or comments.    Thank you for the opportunity to be of service to you and your patient.    Principal result :  Jesusita Rae  Division of Diabetes and Endocrinology  [facility]    References:  1.  ISCD position statements:  www.iscd.org  (includes the report of the   2015  Position Development Conference)    Technical comments:    [facility] BONE DENSITOMETRY

## 2018-10-05 NOTE — PROGRESS NOTES
Patient presents to the [facility] for Prolia injection.  Order written by Kavitha Casey MD was completed today. Name and  verified with patient. See MAR for medication details. Medication was divided into 1 syringe by pharmacy and given in the following sites left arm. Labs were drawn during this encounter. Patient tolerated injection well and was discharged to home. Patient to return in 6 months.    Administrations This Visit     denosumab (PROLIA) injection 60 mg     Admin Date Action Dose Route Administered By             10/05/2018 Given 60 mg Subcutaneous Heather Lemus, OWEN Student      Prolia injection given during encounter completed by KEVIN Lemus CMA student. Preceptor observed student during all patient care activities.    Eliot Mcclelland LPN

## 2018-10-05 NOTE — LETTER
10/5/2018       RE: Nathalia Syed  [address]  [place] [facility] [address]     Dear Colleague,    Thank you for referring your patient, Nathalia Syed, to the [facility] ENDOCRINOLOGY at [facility]. Please see a copy of my visit note below.    [facility]  Endocrinology  Kavitha Casey MD  10/05/2018      Chief Complaint:   RECHECK     History of Present Illness:   Nathalia Syed is a 63 year old female who presents for follow up on osteoporosis.    #1 osteoporosis s/p 2 years of Forteo ending in 2018 and start Prolia in 2018. Pt is intolerant of Reclast.   Patient was initially seen in  and noted to have osteoporosis. She underwent menopause in  and has not been on hormone replacement therapy. Patient started Forteo .    Interval history: Her work has been accommodating for her. She has used a handicap parking permit which is about to  and would like this renewed.  Last Forteo injection was 2018.  Patient is pending her first dose of Reclast today.  2018 DEXA scan formal report is pending but this shows a T score of -3.6 at the lumbar spine and a Z score -2.3 at the lumbar spine.  Of note, she remains on a handicap disability sticker due to her low bone density, history of hip fracture, and unsteady gait.  She does not anticipate any upcoming dental extractions.     #2 hypertension:   In 2017 patient noted increasing blood pressures.    #3 hypertriglycerides:   Noted in 2017. Not discussed today.     #4 headaches and dizziness:   Since 2017, the patient reports a history of headaches, eye pain, and dizziness.  She had an EEG in 2017 which was unremarkable.  There was no clear exacerbating or relieving factors.  She reports that the intermittent head pain can be bilateral.  She's never had a head MRI.  She is uncertain whether her symptoms are necessarily related to her Forteo, although she was  "on the Forteo without symptoms for roughly 3 months.    Interval history: still has some headaches but has not had any severe headaches.     #5 anxiety:   Patient started on Cymbalta in 2017. She did not find this helpful. Her blood pressure has been in the systolic 120-130 range.     #6  Musculoskeletal concerns  For the past year, she reports pain on her right hip that shoots across the front of her pelvis.  This last for several minutes and inhibits her ability to walk.  This happens roughly once per month.  This resolves with cessation of movement.  This does not happen at night.  In addition, she also reports pain in her hands and fingers (right side close (which shoot upwards.)    Review of Systems:   Pertinent items are noted in HPI, remainder of complete ROS is negative.      Active Medications:     Current Outpatient Prescriptions:      ASPIRIN PO, Take 81 mg by mouth, Disp: , Rfl:      calcium carbonate (OS-MEGHAN 500 MG [place]. [place]) 500 MG tablet, Take 1,000 mg by mouth 2 times daily, Disp: , Rfl:      MAGNESIUM OXIDE PO, Take 250 mg by mouth, Disp: , Rfl:      Omega-3 Fatty Acids (FISH OIL PO), , Disp: , Rfl:      POTASSIUM CITRATE PO, , Disp: , Rfl:      VITAMIN D, CHOLECALCIFEROL, PO, Take 2,000 Units by mouth daily, Disp: , Rfl:      Famciclovir (FAMVIR PO), Take 500 mg by mouth As needed, Disp: , Rfl:   No current facility-administered medications for this visit.       Allergies:   Tramadol; Reclast [zoledronic acid]; Aspirin; Codeine; and Rofecoxib      Past Medical History:  Senile osteoporosis   Difficulty swallowing     Past Surgical History:  History reviewed. No pertinent surgical history.    Family History:   History reviewed. No pertinent family history.      Social History:     Non smoker     Physical Exam:   /83  Pulse 67  Ht 1.64 m (5' 4.57\")  Wt 64.1 kg (141 lb 5 oz)  BMI 23.83 kg/m2   GENERAL APPEARANCE: Alert and no distress  NECK: No lymphadenopathy appreciated  Thyroid: No " obvious nodules palpated   CV: RRR without M/R/G  Lungs: CTA bilaterally  Abdomen: Soft, Nontender, non distended, positive bowel sounds   Neuro:no focal deficits  Skin: No infection in feet   Mood: Normal   Lymph: neg in neck and supraclavicular area  MSK:   Hips - No pain to internal or external rotation    Data:    Assessment and Plan:  #1 Senile osteoporosis  The patient has received 2 years of Forteo (ending in 2018) of the.  She does not tolerate Reclast.  She is scheduled for her first dose of Prolia in October 2018.  We will call the patient next week.  If she tolerates the Prolia, she will plan repeating the Prolia injection in 6 months locally.    - 25 Hydroxyvitamin D2 and D3  - Basic metabolic panel  - Protein electrophoresis  - Vitamin B12  - Hemoglobin A1c  - TSH  - T4 free  - CK total  - Erythrocyte sedimentation rate auto  - CBC with platelets      #2 hypertension:   Blood pressure reasonable today.  Patient to follow-up with primary provider.    #3 hypertriglycerides:   Not discussed today.    #4 headaches and dizziness:   Reports symptoms have generally improved    #5 anxiety:   Not discussed today.  Patient no longer on Cymbalta.    #6 musculoskeletal concerns  Labs as below. She will see the orthopedics walk in clinic today.   - 25 Hydroxyvitamin D2 and D3  - Basic metabolic panel  - Protein electrophoresis  - Vitamin B12  - Hemoglobin A1c  - TSH  - T4 free  - CK total  - Erythrocyte sedimentation rate auto  - CBC with platelets       Follow-up: Return in about 1 year (around 10/5/2019).         Scribe Disclosure:   I, Jagjit Fiore, am serving as a scribe to document services personally performed by Kavitha Casey MD at this visit, based upon the provider's statements to me. All documentation has been reviewed by the aforementioned provider prior to being entered into the official medical record.     Portions of this medical record were completed by a scribe. UPON MY REVIEW AND AUTHENTICATION  BY ELECTRONIC SIGNATURE, this confirms (a) I performed the applicable clinical services, and (b) the record is accurate.        Kavitha Casey MD

## 2018-10-05 NOTE — LETTER
Patient:  Nathalia Syed  :   1955  MRN:     [id number]        Ms.Kathy Annie Syed  [address]  [facility] [address]        2018    Dear Nathalia    Here is your lipid panel which was added to your blood draw from Friday. It looks better than your previous values. Your triglycerides have improved 159 and LDL cholesterol is improved 119.    If you have any questions, please feel free to contact my nurse at [phone] select option #3 for triage nurse  or  option #1 for scheduling related questions.    Regards    Kavitha Casey MD        Resulted Orders   Calcium   Result Value Ref Range    Calcium 9.5 8.5 - 10.1 mg/dL   Creatinine   Result Value Ref Range    Creatinine 0.75 0.52 - 1.04 mg/dL    GFR Estimate 78 >60 mL/min/1.7m2      Comment:      Non  GFR Calc    GFR Estimate If Black >90 >60 mL/min/1.7m2      Comment:      African American GFR Calc   Phosphorus   Result Value Ref Range    Phosphorus 3.8 2.5 - 4.5 mg/dL   25 Hydroxyvitamin D2 and D3   Result Value Ref Range    25 OH Vit D2 <5 ug/L    25 OH Vit D3 77 ug/L    25 OH Vit D total <82 (H) 20 - 75 ug/L      Comment:      Season, race, dietary intake, and treatment affect the concentration of   25-hydroxy-Vitamin D. Values may decrease during winter months and increase   during summer months. Values 20-29 ug/L may indicate Vitamin D insufficiency   and values <20 ug/L may indicate Vitamin D deficiency.  This test was developed and its performance characteristics determined by the   [facility],  Special Chemistry Laboratory. It has   not been cleared or approved by the FDA. The laboratory is regulated under   CLIA as qualified to perform high-complexity testing. This test is used for   clinical purposes. It should not be regarded as investigational or for   research.     Basic metabolic panel   Result Value Ref Range    Sodium 139 133 - 144 mmol/L    Potassium 3.5 3.4 - 5.3 mmol/L     Chloride 106 94 - 109 mmol/L    Carbon Dioxide 27 20 - 32 mmol/L    Anion Gap 6 3 - 14 mmol/L    Glucose 85 70 - 99 mg/dL    Urea Nitrogen 14 7 - 30 mg/dL    Creatinine 0.73 0.52 - 1.04 mg/dL    GFR Estimate 81 >60 mL/min/1.7m2      Comment:      Non  GFR Calc    GFR Estimate If Black >90 >60 mL/min/1.7m2      Comment:       GFR Calc    Calcium 9.3 8.5 - 10.1 mg/dL   Protein electrophoresis   Result Value Ref Range    Albumin Fraction 4.7 3.7 - 5.1 g/dL    Alpha 1 Fraction 0.3 0.2 - 0.4 g/dL    Alpha 2 Fraction 0.8 0.5 - 0.9 g/dL    Beta Fraction 0.8 0.6 - 1.0 g/dL    Gamma Fraction 1.3 0.7 - 1.6 g/dL    Monoclonal Peak 0.0 0.0 g/dL    ELP Interpretation:       Essentially normal electrophoretic pattern. No monoclonal protein seen. Pathologic   significance requires clinical correlation. JUDIE Maddox M.D., Ph.D., Pathologist ([phone]).      Vitamin B12   Result Value Ref Range    Vitamin B12 778 193 - 986 pg/mL   Hemoglobin A1c   Result Value Ref Range    Hemoglobin A1C 5.3 0 - 5.6 %      Comment:      Normal <5.7% Prediabetes 5.7-6.4%  Diabetes 6.5% or higher - adopted from ADA   consensus guidelines.     TSH   Result Value Ref Range    TSH 0.77 0.40 - 4.00 mU/L   T4 free   Result Value Ref Range    T4 Free 0.87 0.76 - 1.46 ng/dL   CK total   Result Value Ref Range    CK Total 114 30 - 225 U/L   Erythrocyte sedimentation rate auto   Result Value Ref Range    Sed Rate 11 0 - 30 mm/h   CBC with platelets   Result Value Ref Range    WBC 7.5 4.0 - 11.0 10e9/L    RBC Count 4.89 3.8 - 5.2 10e12/L    Hemoglobin 14.4 11.7 - 15.7 g/dL    Hematocrit 44.1 35.0 - 47.0 %    MCV 90 78 - 100 fl    MCH 29.4 26.5 - 33.0 pg    MCHC 32.7 31.5 - 36.5 g/dL    RDW 12.0 10.0 - 15.0 %    Platelet Count 265 150 - 450 10e9/L   Lipid Profile   Result Value Ref Range    Cholesterol 200 (H) <200 mg/dL      Comment:      Desirable:       <200 mg/dl    Triglycerides 159 (H) <150 mg/dL      Comment:       Borderline high:  150-199 mg/dl  High:             200-499 mg/dl  Very high:       >499 mg/dl      HDL Cholesterol 48 (L) >49 mg/dL    LDL Cholesterol Calculated 119 (H) <100 mg/dL      Comment:      Above desirable:  100-129 mg/dl  Borderline High:  130-159 mg/dL  High:             160-189 mg/dL  Very high:       >189 mg/dl      Non HDL Cholesterol 151 (H) <130 mg/dL      Comment:      Above Desirable:  130-159 mg/dl  Borderline high:  160-189 mg/dl  High:             190-219 mg/dl  Very high:       >219 mg/dl       [facility]

## 2018-10-05 NOTE — MR AVS SNAPSHOT
After Visit Summary   10/5/2018    Nathalia Syed    MRN: [id number]           Patient Information     Date Of Birth          1955        Visit Information        Provider Department      10/5/2018 1:00 PM  43 ATC;  [facility] Specialty and Procedure        Today's Diagnoses     Senile osteoporosis    -  1      Care Instructions      Denosumab Solution for injection  What is this medicine?  DENOSUMAB (den oh lauren mab) slows bone breakdown. Prolia is used to treat osteoporosis in women after menopause and in men. Xgeva is used to prevent bone fractures and other bone problems caused by cancer bone metastases. Xgeva is also used to treat giant cell tumor of the bone.  This medicine may be used for other purposes; ask your health care provider or pharmacist if you have questions.  What should I tell my health care provider before I take this medicine?  They need to know if you have any of these conditions:    dental disease    eczema    infection or history of infections    kidney disease or on dialysis    low blood calcium or vitamin D    malabsorption syndrome    scheduled to have surgery or tooth extraction    taking medicine that contains denosumab    thyroid or parathyroid disease    an unusual reaction to denosumab, other medicines, foods, dyes, or preservatives    pregnant or trying to get pregnant    breast-feeding  How should I use this medicine?  This medicine is for injection under the skin. It is given by a health care professional in a hospital or clinic setting.  If you are getting Prolia, a special MedGuide will be given to you by the pharmacist with each prescription and refill. Be sure to read this information carefully each time.  For Prolia, talk to your pediatrician regarding the use of this medicine in children. Special care may be needed. For Xgeva, talk to your pediatrician regarding the use of this medicine in children. While this  drug may be prescribed for children as young as 13 years for selected conditions, precautions do apply.  Overdosage: If you think you've taken too much of this medicine contact a poison control center or emergency room at once.  NOTE: This medicine is only for you. Do not share this medicine with others.  What if I miss a dose?  It is important not to miss your dose. Call your doctor or health care professional if you are unable to keep an appointment.  What may interact with this medicine?  Do not take this medicine with any of the following medications:    other medicines containing denosumab  This medicine may also interact with the following medications:    medicines that suppress the immune system    medicines that treat cancer    steroid medicines like prednisone or cortisone  This list may not describe all possible interactions. Give your health care provider a list of all the medicines, herbs, non-prescription drugs, or dietary supplements you use. Also tell them if you smoke, drink alcohol, or use illegal drugs. Some items may interact with your medicine.  What should I watch for while using this medicine?  Visit your doctor or health care professional for regular checks on your progress. Your doctor or health care professional may order blood tests and other tests to see how you are doing.  Call your doctor or health care professional if you get a cold or other infection while receiving this medicine. Do not treat yourself. This medicine may decrease your body's ability to fight infection.  You should make sure you get enough calcium and vitamin D while you are taking this medicine, unless your doctor tells you not to. Discuss the foods you eat and the vitamins you take with your health care professional.  See your dentist regularly. Brush and floss your teeth as directed. Before you have any dental work done, tell your dentist you are receiving this medicine.  Do not become pregnant while taking this  medicine or for 5 months after stopping it. Women should inform their doctor if they wish to become pregnant or think they might be pregnant. There is a potential for serious side effects to an unborn child. Talk to your health care professional or pharmacist for more information.  What side effects may I notice from receiving this medicine?  Side effects that you should report to your doctor or health care professional as soon as possible:    allergic reactions like skin rash, itching or hives, swelling of the face, lips, or tongue    breathing problems    chest pain    fast, irregular heartbeat    feeling faint or lightheaded, falls    fever, chills, or any other sign of infection    muscle spasms, tightening, or twitches    numbness or tingling    skin blisters or bumps, or is dry, peels, or red    slow healing or unexplained pain in the mouth or jaw    unusual bleeding or bruising  Side effects that usually do not require medical attention (Report these to your doctor or health care professional if they continue or are bothersome.):    muscle pain    stomach upset, gas  This list may not describe all possible side effects. Call your doctor for medical advice about side effects. You may report side effects to FDA at 8-921-FDA-5814.  Where should I keep my medicine?  This medicine is only given in a clinic, doctor's office, or other health care setting and will not be stored at home.  NOTE: This sheet is a summary. It may not cover all possible information. If you have questions about this medicine, talk to your doctor, pharmacist, or health care provider.  NOTE:This sheet is a summary. It may not cover all possible information. If you have questions about this medicine, talk to your doctor, pharmacist, or health care provider. Copyright  2016 Gold Standard                Follow-ups after your visit        Future tests that were ordered for you today     Open Future Orders        Priority Expected Expires Ordered     25 Hydroxyvitamin D2 and D3 Routine  10/5/2019 10/5/2018    Basic metabolic panel Routine  10/5/2019 10/5/2018    Protein electrophoresis Routine  10/5/2019 10/5/2018    Vitamin B12 Routine  10/5/2019 10/5/2018    Hemoglobin A1c Routine  10/5/2019 10/5/2018    TSH Routine  10/5/2019 10/5/2018    T4 free Routine  10/5/2019 10/5/2018    CK total Routine  10/5/2019 10/5/2018    Erythrocyte sedimentation rate auto Routine  10/5/2019 10/5/2018    CBC with platelets Routine  10/5/2019 10/5/2018            Who to contact     If you have questions or need follow up information about today's clinic visit or your schedule please contact [facility] SPECIALTY AND PROCEDURE directly at [phone].  Normal or non-critical lab and imaging results will be communicated to you by [employer], letter or phone within 4 business days after the clinic has received the results. If you do not hear from us within 7 days, please contact the clinic through [employer] or phone. If you have a critical or abnormal lab result, we will notify you by phone as soon as possible.  Submit refill requests through [employer] or call your pharmacy and they will forward the refill request to us. Please allow 3 business days for your refill to be completed.          Additional Information About Your Visit        FreshRealmharTraceSecurity Information     [employer] gives you secure access to your electronic health record. If you see a primary care provider, you can also send messages to your care team and make appointments. If you have questions, please call your primary care clinic.  If you do not have a primary care provider, please call [phone] and they will assist you.        Care EveryWhere ID     This is your Care EveryWhere ID. This could be used by other organizations to access your [place] medical records  [id number]        Your Vitals Were     Pulse Temperature Respirations Pulse Oximetry BMI (Body Mass Index)       67 98.2  F (36.8  C)  (Oral) 16 98% 24.14 kg/m2        Blood Pressure from Last 3 Encounters:   10/05/18 147/84   10/05/18 130/83   10/20/17 146/81    Weight from Last 3 Encounters:   10/05/18 64.1 kg (141 lb 6.4 oz)   10/05/18 64.1 kg (141 lb 5 oz)              We Performed the Following     Calcium     Creatinine     Phosphorus        Primary Care Provider Office Phone # Fax #    Inder Nicholson [phone]        [address] [facility] [address]        Equal Access to Services     [facility]: Hadii aad ku hadasho Soomaali, waaxda luqadaha, qaybta kaalmada ademartir, sheryl jj . So [facility] [phone].    ATENCIÓN: Si habla español, tiene a samayoa disposición servicios gratuitos de asistencia lingüística. [facility] [phone].    We comply with applicable federal civil rights laws and [place] laws. We do not discriminate on the basis of race, color, national origin, age, disability, sex, sexual orientation, or gender identity.            Thank you!     Thank you for choosing [facility] SPECIALTY AND PROCEDURE  for your care. Our goal is always to provide you with excellent care. Hearing back from our patients is one way we can continue to improve our services. Please take a few minutes to complete the written survey that you may receive in the mail after your visit with us. Thank you!             Your Updated Medication List - Protect others around you: Learn how to safely use, store and throw away your medicines at www.disposemymeds.org.          This list is accurate as of 10/5/18  1:22 PM.  Always use your most recent med list.                   Brand Name Dispense Instructions for use Diagnosis    ASPIRIN PO      Take 81 mg by mouth        calcium carbonate 500 mg (elemental) 500 MG tablet    OS-MEGHAN     Take 1,000 mg by mouth 2 times daily        FAMVIR PO      Take 500 mg by mouth As needed        FISH OIL PO           MAGNESIUM OXIDE PO       Take 250 mg by mouth        POTASSIUM CITRATE PO           VITAMIN D (CHOLECALCIFEROL) PO      Take 2,000 Units by mouth daily

## 2018-10-05 NOTE — LETTER
Patient:  Nathalia Syed  :   1955  MRN:     [id number]        Ms.Kathy Annie Syed  [address]  [place] [address]        2018    Dear Nathalia    Here are your labs. You can reduce your vitamin D at 1000 international units daily. Otherwise it looks great.    If you have any questions, please feel free to contact my nurse at [phone] select option #3 for triage nurse  or  option #1 for scheduling related questions.    Regards    Kavitha Casey MD        Resulted Orders   Calcium   Result Value Ref Range    Calcium 9.5 8.5 - 10.1 mg/dL   Creatinine   Result Value Ref Range    Creatinine 0.75 0.52 - 1.04 mg/dL    GFR Estimate 78 >60 mL/min/1.7m2      Comment:      Non  GFR Calc    GFR Estimate If Black >90 >60 mL/min/1.7m2      Comment:      African American GFR Calc   Phosphorus   Result Value Ref Range    Phosphorus 3.8 2.5 - 4.5 mg/dL   25 Hydroxyvitamin D2 and D3   Result Value Ref Range    25 OH Vit D2 <5 ug/L    25 OH Vit D3 77 ug/L    25 OH Vit D total <82 (H) 20 - 75 ug/L      Comment:      Season, race, dietary intake, and treatment affect the concentration of   25-hydroxy-Vitamin D. Values may decrease during winter months and increase   during summer months. Values 20-29 ug/L may indicate Vitamin D insufficiency   and values <20 ug/L may indicate Vitamin D deficiency.  This test was developed and its performance characteristics determined by the   [facility],  Special Chemistry Laboratory. It has   not been cleared or approved by the FDA. The laboratory is regulated under   CLIA as qualified to perform high-complexity testing. This test is used for   clinical purposes. It should not be regarded as investigational or for   research.     Basic metabolic panel   Result Value Ref Range    Sodium 139 133 - 144 mmol/L    Potassium 3.5 3.4 - 5.3 mmol/L    Chloride 106 94 - 109 mmol/L    Carbon Dioxide 27 20 - 32 mmol/L    Anion Gap 6 3 -  14 mmol/L    Glucose 85 70 - 99 mg/dL    Urea Nitrogen 14 7 - 30 mg/dL    Creatinine 0.73 0.52 - 1.04 mg/dL    GFR Estimate 81 >60 mL/min/1.7m2      Comment:      Non  GFR Calc    GFR Estimate If Black >90 >60 mL/min/1.7m2      Comment:       GFR Calc    Calcium 9.3 8.5 - 10.1 mg/dL   Protein electrophoresis   Result Value Ref Range    Albumin Fraction 4.7 3.7 - 5.1 g/dL    Alpha 1 Fraction 0.3 0.2 - 0.4 g/dL    Alpha 2 Fraction 0.8 0.5 - 0.9 g/dL    Beta Fraction 0.8 0.6 - 1.0 g/dL    Gamma Fraction 1.3 0.7 - 1.6 g/dL    Monoclonal Peak 0.0 0.0 g/dL    ELP Interpretation:       Essentially normal electrophoretic pattern. No monoclonal protein seen. Pathologic   significance requires clinical correlation. JUDIE Maddox M.D., Ph.D., Pathologist ([phone]).      Vitamin B12   Result Value Ref Range    Vitamin B12 778 193 - 986 pg/mL   Hemoglobin A1c   Result Value Ref Range    Hemoglobin A1C 5.3 0 - 5.6 %      Comment:      Normal <5.7% Prediabetes 5.7-6.4%  Diabetes 6.5% or higher - adopted from ADA   consensus guidelines.     TSH   Result Value Ref Range    TSH 0.77 0.40 - 4.00 mU/L   T4 free   Result Value Ref Range    T4 Free 0.87 0.76 - 1.46 ng/dL   CK total   Result Value Ref Range    CK Total 114 30 - 225 U/L   Erythrocyte sedimentation rate auto   Result Value Ref Range    Sed Rate 11 0 - 30 mm/h   CBC with platelets   Result Value Ref Range    WBC 7.5 4.0 - 11.0 10e9/L    RBC Count 4.89 3.8 - 5.2 10e12/L    Hemoglobin 14.4 11.7 - 15.7 g/dL    Hematocrit 44.1 35.0 - 47.0 %    MCV 90 78 - 100 fl    MCH 29.4 26.5 - 33.0 pg    MCHC 32.7 31.5 - 36.5 g/dL    RDW 12.0 10.0 - 15.0 %    Platelet Count 265 150 - 450 10e9/L       [facility]

## 2018-10-08 ENCOUNTER — TELEPHONE (OUTPATIENT)
Dept: ENDOCRINOLOGY | Facility: CLINIC | Age: 63
End: 2018-10-08

## 2018-10-08 DIAGNOSIS — M81.0 OSTEOPOROSIS: Primary | ICD-10-CM

## 2018-10-08 LAB
ALBUMIN SERPL ELPH-MCNC: 4.7 G/DL (ref 3.7–5.1)
ALPHA1 GLOB SERPL ELPH-MCNC: 0.3 G/DL (ref 0.2–0.4)
ALPHA2 GLOB SERPL ELPH-MCNC: 0.8 G/DL (ref 0.5–0.9)
B-GLOBULIN SERPL ELPH-MCNC: 0.8 G/DL (ref 0.6–1)
CHOLEST SERPL-MCNC: 200 MG/DL
DEPRECATED CALCIDIOL+CALCIFEROL SERPL-MC: <82 UG/L (ref 20–75)
GAMMA GLOB SERPL ELPH-MCNC: 1.3 G/DL (ref 0.7–1.6)
HDLC SERPL-MCNC: 48 MG/DL
LDLC SERPL CALC-MCNC: 119 MG/DL
M PROTEIN SERPL ELPH-MCNC: 0 G/DL
NONHDLC SERPL-MCNC: 151 MG/DL
PROT PATTERN SERPL ELPH-IMP: NORMAL
TRIGL SERPL-MCNC: 159 MG/DL
VITAMIN D2 SERPL-MCNC: <5 UG/L
VITAMIN D3 SERPL-MCNC: 77 UG/L

## 2018-10-08 NOTE — PROGRESS NOTES
Dear Nathalia    Here is your bone density which shows osteoporosis that we knew about. I would like to see you next year to repeat this scan to make sure that the Prolia is working. You should get your next prolia shot around April 5, 2019 from Dr. Nicholson's office. I have sent a letter with my notes, but please touch base with him to confirm.    If you have any questions, please feel free to contact my nurse at [phone] select option #3 for triage nurse  or  option #1 for scheduling related questions.    Regards    Kavitha Casey MD

## 2018-10-08 NOTE — PROGRESS NOTES
Dear Nathalia    Here are your labs. You can reduce your vitamin D at 1000 international units daily. Otherwise it looks great.    If you have any questions, please feel free to contact my nurse at [phone] select option #3 for triage nurse  or  option #1 for scheduling related questions.    Regards    Kavitha Casey MD

## 2018-10-08 NOTE — TELEPHONE ENCOUNTER
PT tolerated Prolia - will have pt get Prolia in 6 months locally    ---  Dear Nathalia    Here are your labs. You can reduce your vitamin D at 1000 international units daily. Otherwise it looks great.    If you have any questions, please feel free to contact my nurse at [phone] select option #3 for triage nurse  or  option #1 for scheduling related questions.    Regards    Kavitha Casey MD

## 2018-10-08 NOTE — LETTER
"Dr. Inder Nicholson  [facility]  [address] [place] [address]        RE:  Ms.Kathy Annie Syed   [address] [facility] [address]        October 8, 2018    Dear Dr. Nicholson    I am working Nathalia for her bones. As you might remember, she does not tolerate reclast, took Forteo for 2 years, and now has received Prolia without incident (60 mg on 10/5/2018). She will need to be on Prolia every 6 months (would like to do locally) indefinitely to help maintain her bones to reduce her risk for fracture. Can you facilitate this?    I will see her one more time on 10/2019 to make sure she is doing well from a bone standpoint. However, it is much easier for her to see you for her injections. Most notably, she will need her next Prolia injection around 4/5/2019 or so.    If you have any questions, please feel free to contact my nurse at [phone] select option #3 for triage nurse  or  option #1 for scheduling related questions.    Regards    Kavitha Casey MD    \"cc\" Please send most recent notes, labs, letter, telephone call, DXA scan, infusion note (ie everything from her visit here in October 2018) to him        "

## 2018-10-09 NOTE — PROGRESS NOTES
Dear Nathalia    Here is your lipid panel which was added to your blood draw from Friday. It looks better than your previous values. Your triglycerides have improved 159 and LDL cholesterol is improved 119.    If you have any questions, please feel free to contact my nurse at [phone] select option #3 for triage nurse  or  option #1 for scheduling related questions.    Regards    Kavitha Casey MD

## 2019-02-20 NOTE — LETTER
Patient:  Nathalia Syed  :   1955  MRN:     [id number]        Ms.Kathy Annie Syed  [address] [facility] [address]        2019    Dear Nathalia    This letter is to remind you to have the Prolia shot at Dr. Nicholson's office to make sure you stay on the 6 month program (last shot here on 10/5/2018, next shot about 6 months after this shot so it will be around  or later). If you or Dr. Nicholson have questions, please let me know.    If you have any questions, please feel free to contact my nurse at [phone] select option #3 for triage nurse  or  option #1 for scheduling related questions.    Regards    Kavitha Casey MD    --  Progress Notes   Heather Lemus (Medical Assistant)      Patient presents to the [facility] for Prolia injection.  Order written by Kavitha Casey MD was completed today. Name and  verified with patient. See MAR for medication details. Medication was divided into 1 syringe by pharmacy and given in the following sites left arm. Labs were drawn during this encounter. Patient tolerated injection well and was discharged to home. Patient to return in 6 months.         Administrations This Visit                   denosumab (PROLIA) injection 60 mg                   Admin Date Action Dose Route Administered By                                   10/05/2018 Given 60 mg Subcutaneous Heather Lemus CMA Student        Prolia injection given during encounter completed by KEVIN Lemus CMA student. Preceptor observed student during all patient care activities.

## 2019-04-11 ENCOUNTER — TELEPHONE (OUTPATIENT)
Dept: ENDOCRINOLOGY | Facility: CLINIC | Age: 64
End: 2019-04-11

## 2019-04-11 DIAGNOSIS — M81.0 SENILE OSTEOPOROSIS: Primary | ICD-10-CM

## 2019-04-11 NOTE — TELEPHONE ENCOUNTER
Nathalia calls to ask about side effects of Prolia. She had  her  first injection  10/2018 and is due this month for the second.   She tells me that shortly after the Prolia 60 mg  injection she started to have  a burning in  her stomach ( compared to what an ulcer is  described as)  She has never had an ulcer  but states the  burning  has been there  Up until recently it is going away.   She has developed a skin  reaction where she has little  bumps  like  sand and other areas that are  just brown spots now.   She had Forteo for 2 years and  1 dose of Reclast where she passed out and had a fracture.     Is this a side effect of Prolia ? Should she get the next dose?    She os also asking about DEXA orders and when the next one should be.

## 2019-04-11 NOTE — LETTER
Patient:  Nathalia Syed  :   1955  MRN:     [id number]        Ms.Kathy Annie Syed  [address]  [place] [address]        2019    Dear ,    Here is your handicap permit - please fill out the form and submit accordingly.    If you have any questions, please feel free to contact my nurse at [phone] select option #3 for triage nurse  or  option #1 for scheduling related questions.    Regards    Kavihta Casey MD

## 2019-04-11 NOTE — TELEPHONE ENCOUNTER
Will order DXA  ----- Message from Iveth MENEZES Link sent at 4/11/2019 12:29 PM CDT -----  Regarding: FW: schedule  October   Hi Dr diana,    I don't see any dexa orders. Can you enter them and let me know when complete? Thanks.  ----- Message -----  From: Alondra Roach RN  Sent: 4/10/2019   8:32 AM  To: [id number]  Subject: schedule  October                                    ----- Message -----  From: Kavitha Diana MD  Sent: 4/10/2019  To: Kavitha Diana MD, #    Pt to see me in October 2019 - do dexa before hand

## 2019-04-12 ENCOUNTER — TELEPHONE (OUTPATIENT)
Dept: ENDOCRINOLOGY | Facility: CLINIC | Age: 64
End: 2019-04-12

## 2019-04-17 NOTE — TELEPHONE ENCOUNTER
April  Prolia inj to be done in [place]. October will be here at the  [facility]

## 2019-07-05 ENCOUNTER — TELEPHONE (OUTPATIENT)
Dept: ENDOCRINOLOGY | Facility: CLINIC | Age: 64
End: 2019-07-05

## 2019-07-05 NOTE — TELEPHONE ENCOUNTER
Called pt - pt to let me know if she needs an updated permit    ----- Message from Kavitha Casey MD sent at 10/5/2018  1:03 PM CDT -----  olu pt need updated handicap permit?

## 2019-07-05 NOTE — TELEPHONE ENCOUNTER
[facility] Call Center    Phone Message    May a detailed message be left on voicemail: yes    Reason for Call: Other: Pt called back stating she already has her updated permit.      Action Taken: Message routed to:  [facility] see above

## 2019-08-05 ENCOUNTER — TELEPHONE (OUTPATIENT)
Dept: ENDOCRINOLOGY | Facility: CLINIC | Age: 64
End: 2019-08-05

## 2019-08-05 NOTE — TELEPHONE ENCOUNTER
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  The letter that Dr. Casey provided last year states exactly what is needed this year.  Patient will be out of country 8/16/19 - 8/25/19 and is hoping to get letter before 8/16/19, as school year starts 8/26/19.  Please follow up with patient.  Thank you!     Action Taken: Message routed to:  [facility]

## 2019-08-13 ENCOUNTER — TELEPHONE (OUTPATIENT)
Dept: ENDOCRINOLOGY | Facility: CLINIC | Age: 64
End: 2019-08-13

## 2019-08-13 NOTE — TELEPHONE ENCOUNTER
Letter completed and sent to patients home address at:    [address]  [address] [place] [address]

## 2019-10-04 ENCOUNTER — HEALTH MAINTENANCE LETTER (OUTPATIENT)
Age: 64
End: 2019-10-04

## 2019-10-18 ENCOUNTER — ANCILLARY PROCEDURE (OUTPATIENT)
Dept: BONE DENSITY | Facility: CLINIC | Age: 64
End: 2019-10-18
Attending: INTERNAL MEDICINE
Payer: COMMERCIAL

## 2019-10-18 ENCOUNTER — OFFICE VISIT (OUTPATIENT)
Dept: ORTHOPEDICS | Facility: CLINIC | Age: 64
End: 2019-10-18
Payer: COMMERCIAL

## 2019-10-18 ENCOUNTER — ANCILLARY PROCEDURE (OUTPATIENT)
Dept: MRI IMAGING | Facility: CLINIC | Age: 64
End: 2019-10-18
Attending: FAMILY MEDICINE
Payer: COMMERCIAL

## 2019-10-18 ENCOUNTER — OFFICE VISIT (OUTPATIENT)
Dept: ENDOCRINOLOGY | Facility: CLINIC | Age: 64
End: 2019-10-18
Payer: COMMERCIAL

## 2019-10-18 VITALS
DIASTOLIC BLOOD PRESSURE: 88 MMHG | BODY MASS INDEX: 24.03 KG/M2 | WEIGHT: 144.2 LBS | HEIGHT: 65 IN | SYSTOLIC BLOOD PRESSURE: 146 MMHG | HEART RATE: 72 BPM

## 2019-10-18 VITALS — BODY MASS INDEX: 23.99 KG/M2 | HEIGHT: 65 IN | WEIGHT: 144 LBS

## 2019-10-18 DIAGNOSIS — M81.0 SENILE OSTEOPOROSIS: ICD-10-CM

## 2019-10-18 DIAGNOSIS — M79.2 RADICULAR PAIN OF RIGHT UPPER EXTREMITY: Primary | ICD-10-CM

## 2019-10-18 DIAGNOSIS — M81.0 OSTEOPOROSIS WITHOUT CURRENT PATHOLOGICAL FRACTURE, UNSPECIFIED OSTEOPOROSIS TYPE: Primary | ICD-10-CM

## 2019-10-18 DIAGNOSIS — M79.2 RADICULAR PAIN OF RIGHT UPPER EXTREMITY: ICD-10-CM

## 2019-10-18 DIAGNOSIS — Z23 NEED FOR PROPHYLACTIC VACCINATION AND INOCULATION AGAINST INFLUENZA: ICD-10-CM

## 2019-10-18 RX ORDER — ATORVASTATIN CALCIUM 20 MG/1
20 TABLET, FILM COATED ORAL DAILY
COMMUNITY

## 2019-10-18 ASSESSMENT — ENCOUNTER SYMPTOMS
ARTHRALGIAS: 1
EXERCISE INTOLERANCE: 0
DISTURBANCES IN COORDINATION: 0
CHILLS: 0
FEVER: 0
SPEECH CHANGE: 0
DECREASED APPETITE: 0
JOINT SWELLING: 1
HEMATURIA: 0
HEADACHES: 0
TREMORS: 0
WEIGHT GAIN: 0
FLANK PAIN: 0
DIZZINESS: 1
NIGHT SWEATS: 0
POLYPHAGIA: 0
HYPERTENSION: 1
LIGHT-HEADEDNESS: 1
INCREASED ENERGY: 1
SKIN CHANGES: 0
ORTHOPNEA: 0
HYPOTENSION: 0
SYNCOPE: 0
PALPITATIONS: 1
BACK PAIN: 1
ALTERED TEMPERATURE REGULATION: 0
NAIL CHANGES: 0
LOSS OF CONSCIOUSNESS: 0
DIFFICULTY URINATING: 0
POOR WOUND HEALING: 0
NUMBNESS: 1
SEIZURES: 0
MUSCLE CRAMPS: 1
MEMORY LOSS: 0
STIFFNESS: 1
HALLUCINATIONS: 0
FATIGUE: 1
WEAKNESS: 1
SLEEP DISTURBANCES DUE TO BREATHING: 0
NECK PAIN: 1
LEG PAIN: 0
POLYDIPSIA: 0
WEIGHT LOSS: 0
MUSCLE WEAKNESS: 1
PARALYSIS: 0
MYALGIAS: 1
TINGLING: 0
DYSURIA: 0

## 2019-10-18 ASSESSMENT — MIFFLIN-ST. JEOR
SCORE: 1202.03
SCORE: 1201.12

## 2019-10-18 ASSESSMENT — PAIN SCALES - GENERAL: PAINLEVEL: MODERATE PAIN (4)

## 2019-10-18 NOTE — PROGRESS NOTES
[facility]  Endocrinology  Kavitha Casey MD  10/18/2019      Chief Complaint:   Osteoporosis     History of Present Illness:   Nathalia Syed is a 64 year old female with a history of osteoporosis who presents for evaluation of osteoporosis. Summaries per previous note.    #1 osteoporosis s/p 2 years of Forteo ending in Sept 2018 and start Prolia in October 2018. Pt is intolerant of Reclast.   Patient was initially seen in 2015 and noted to have osteoporosis. She underwent menopause in 2007 and has not been on hormone replacement therapy. Patient started Forteo 2016. She was scheduled for her first dose of Prolia on 10/5/18 as she had already taken 2 years of Forteo and doesn't tolerate Reclast. Labs were also drawn.    Interval History  The patient has had continued bone and muscle pain, but denies any new fractures. She has had no issues with Prolia. She had a DEXA performed on 10/18/19 which showed osteoporosis and a 7.5% increase in bone density of her spine compared to 2018 DEXA.  There is no significant change at the level of the hips, however there was a trend to increasing the bone density. She is not having any dental work performed soon.    #2 hypertension:   In 2017 patient noted increasing blood pressures. She was told to follow up with her primary care provider.    Interval History  Her blood pressure appears to fluctuate without clear pattern. She was started on Lipitor recently.    #3 headaches and dizziness:   Since January 2017, the patient reports a history of headaches, eye pain, and dizziness.  She had an EEG in September 2017 which was unremarkable.  There was no clear exacerbating or relieving factors.  She reports that the intermittent head pain can be bilateral.  She's never had a head MRI.  She is uncertain whether her symptoms are necessarily related to her Forteo, although she was on the Forteo without symptoms for roughly 3 months.    Interval History  She has some slight dizziness  "intermittently, which have become more frequent.    #4 anxiety:  Patient started on Cymbalta in 2017. She did not find this helpful. Her blood pressure has been in the systolic 120-130 range. She was taken of Cymbalta during her previous visit.    Interval History  She has had no issues with anxiety since her previous visit.    #5  Musculoskeletal concerns  For the past year, she reports pain on her right hip that shoots across the front of her pelvis.  This last for several minutes and inhibits her ability to walk.  This happens roughly once per month.  This resolves with cessation of movement.  This does not happen at night.  In addition, she also reports pain in her hands and fingers. She was told to follow up with orthopedics.    Interval History  The patien now reports bilaterally upper arm and shoulder weakness. She developed left arm pain since her previous visit, and later developed \"deep\" pain \"in the bone and nerve\" in her right arm as well. Now her pain is radiating to her low neck. She has sharp nerve pain. This pain is intermittent but is becoming more frequent. Her pain is most prominent after carrying even a light object. She denies any leg pain, but has sole numbness and numbness in her 1st and 2nd toes. She is not woken up at night due to the pain, but feels very sore in the morning. This pain is present about 10% of the day and is not worse at the morning or evening.     #6 Heart Murmur  The patient reportedly had a heart murmur in the ED one month ago when being evaluated for sickness after travelling. She is concerned if this is something that can come and go.    Review of Systems:   Pertinent items are noted in HPI, remainder of complete ROS is negative.     Answers for HPI/ROS submitted by the patient on 10/18/2019   General Symptoms: Yes  Skin Symptoms: Yes  HENT Symptoms: No  EYE SYMPTOMS: No  HEART SYMPTOMS: Yes  LUNG SYMPTOMS: No  INTESTINAL SYMPTOMS: No  URINARY SYMPTOMS: Yes  GYNECOLOGIC " SYMPTOMS: No  BREAST SYMPTOMS: No  SKELETAL SYMPTOMS: Yes  BLOOD SYMPTOMS: No  NERVOUS SYSTEM SYMPTOMS: Yes  MENTAL HEALTH SYMPTOMS: No  Fever: No  Loss of appetite: No  Weight loss: No  Weight gain: No  Fatigue: Yes  Night sweats: No  Chills: No  Increased stress: Yes  Excessive hunger: No  Excessive thirst: No  Feeling hot or cold when others believe the temperature is normal: No  Loss of height: No  Post-operative complications: No  Surgical site pain: No  Hallucinations: No  Change in or Loss of Energy: Yes  Hyperactivity: No  Confusion: No  Changes in hair: No  Changes in moles/birth marks: No  Itching: No  Rashes: No  Changes in nails: No  Acne: No  Hair in places you don't want it: No  Change in facial hair: No  Warts: No  Non-healing sores: No  Scarring: No  Flaking of skin: No  Color changes of hands/feet in cold : No  Sun sensitivity: Yes  Skin thickening: No  Chest pain or pressure: No  Fast or irregular heartbeat: Yes  Pain in legs with walking: No  Trouble breathing while lying down: No  Fingers or toes appear blue: No  High blood pressure: Yes  Low blood pressure: No  Fainting: No  Murmurs: Yes  Pacemaker: No  Varicose veins: No  Edema or swelling: No  Wake up at night with shortness of breath: No  Light-headedness: Yes  Exercise intolerance: No  Trouble holding urine or incontinence: No  Pain or burning: No  Trouble starting or stopping: No  Increased frequency of urination: Yes  Blood in urine: No  Decreased frequency of urination: No  Frequent nighttime urination: No  Flank pain: No  Difficulty emptying bladder: No  Back pain: Yes  Muscle aches: Yes  Neck pain: Yes  Swollen joints: Yes  Joint pain: Yes  Bone pain: Yes  Muscle cramps: Yes  Muscle weakness: Yes  Joint stiffness: Yes  Bone fracture: No  Trouble with coordination: No  Dizziness or trouble with balance: Yes  Fainting or black-out spells: No  Memory loss: No  Headache: No  Seizures: No  Speech problems: No  Tingling: No  Tremor:  "No  Weakness: Yes  Difficulty walking: No  Paralysis: No  Numbness: Yes      Active Medications:     Current Outpatient Medications:      atorvastatin (LIPITOR) 20 MG tablet, Take 20 mg by mouth daily, Disp: , Rfl:      denosumab (PROLIA) 60 MG/ML SOSY injection, Inject 60 mg Subcutaneous once, Disp: , Rfl:      Famciclovir (FAMVIR PO), Take 500 mg by mouth As needed, Disp: , Rfl:      MAGNESIUM OXIDE PO, Take 250 mg by mouth, Disp: , Rfl:      Omega-3 Fatty Acids (FISH OIL PO), , Disp: , Rfl:      POTASSIUM CITRATE PO, , Disp: , Rfl:      VITAMIN D, CHOLECALCIFEROL, PO, Take 2,000 Units by mouth daily, Disp: , Rfl:      ASPIRIN PO, Take 81 mg by mouth, Disp: , Rfl:      calcium carbonate (OS-MEGHAN 500 MG [place]. CA) 500 MG tablet, Take 1,000 mg by mouth 2 times daily, Disp: , Rfl:       Allergies:   Tramadol; Reclast [zoledronic acid]; Aspirin; Codeine; and Rofecoxib      Past Medical History:  Osteoporosis  Dysphagia     Past Surgical History:  The patient has no known surgical history.    Family History:   No family history on file.      Social History:   Social History     Tobacco Use     Smoking status: Never Smoker     Smokeless tobacco: Never Used   Substance Use Topics     Alcohol use: Not on file     Drug use: Not on file        Physical Exam:   BP (!) 146/88   Pulse 72   Ht 1.638 m (5' 4.5\")   Wt 65.4 kg (144 lb 3.2 oz)   BMI 24.37 kg/m      GENERAL APPEARANCE: Alert and no distress  NECK: No lymphadenopathy appreciated  Thyroid: No obvious nodules palpated   CV: RRR without M/R/G  Lungs: CTA bilaterally  Abdomen: Soft, Nontender, non distended, positive bowel sounds   Neuro: Mild weakness in  Upper arm, at the level of both shoulders/proximal arms.   Mood: Normal   Lymph: neg in neck and supraclavicular area    Data:  DEXA Hip/Pelvis/Spine 10/18/19   Data read by Dr. Casey. Official read pending.  Lowest T-score of -3.2  She has had 7.5% increase in spinal bone density compared to 2018 DEXA.    Assessment " and Plan:  #1 osteoporosis s/p 2 years of Forteo ending in Sept 2018 and start Prolia in October 2018. Pt is intolerant of Reclast.   Pt to continue with Prolia for now. Her bone density has increased by 6.9% at the lumbar spine but she continues to have osteoporosis with lowest t-score of -3.2. We will consider Evenity if her bone density decreases while taking Prolia or if he develops fracture while taking Prolia.  - ORTHO  REFERRAL    #2 hypertension  Followed by her primary care provider.    #3 headaches and dizziness  No new medication changes.    #4 anxiety  No new issues with anxiety.    #5  Musculoskeletal concerns  Follow with orthopedics  - ORTHO  REFERRAL    #6 Heart Murmur  Observe for now.    The patient was given a flu shot today.     Follow-up: Return in about 1 year (around 10/18/2020).        Scribe Disclosure:  I, Brandon Zuluaga, am serving as a scribe to document services personally performed by Kavitha Casey MD at this visit, based upon the provider's statements to me. All documentation has been reviewed by the aforementioned provider prior to being entered into the official medical record.     Portions of this medical record were completed by a scribe. UPON MY REVIEW AND AUTHENTICATION BY ELECTRONIC SIGNATURE, this confirms (a) I performed the applicable clinical services, and (b) the record is accurate.

## 2019-10-18 NOTE — LETTER
10/18/2019       RE: Nathalia Syed  [id number]  [place] [address]     Dear Colleague,    Thank you for referring your patient, Nathalia Syed, to the [facility]IN[facility] at [facility]. Please see a copy of my visit note below.          SPORTS & ORTHOPEDIC WALK-IN VISIT 10/18/2019    Primary Care Physician: Dr. Nicholson    Pain started about 4 years ago with pain in both arms. She stated that pain is deep, she occasionally gets nerve pain as well as what feels like bone pain. She has osteoporosis and previously broke right arm. She is starting to get osteoarthritis in her hands. Unable to lift things for long periods of time. She also feels like her arms overall are getting weaker.     Reason for visit:     What part of your body is injured / painful?  bilateral arms    What caused the injury /pain? Unsure    How long ago did your injury occur or pain begin? several years ago    What are your most bothersome symptoms? Pain and Weakness    How would you characterize your symptom?  Sharp nerve pain; deep pain    What makes your symptoms better? Rest and tylenol     What makes your symptoms worse? Other: prolonged holding and lifting     Have you been previously seen for this problem? Yes, 1 year ago    Medical History:    Any recent changes to your medical history? No    Any new medication prescribed since last visit? Yes, cholesterol medication    Have you had surgery on this body part before? No    Social History:    Occupation:     Handedness: Right    Exercise: Walk    Review of Systems:    Do you have fever, chills, weight loss? No    Do you have any vision problems? No    Do you have any chest pain or edema? No    Do you have any shortness of breath or wheezing?  No    Do you have stomach problems? Yes, GI    Do you have any numbness or focal weakness? Yes, feet and toes    Do you have diabetes? No    Do you  "have problems with bleeding or clotting? No    Do you have an rashes or other skin lesions? No         [facility]  Orthopedics  Inder Patton,   10/18/2019     Name: Nathalia Syed  MRN: [id number]  Age: 64 year old  : 1955  Referring provider: Referred Self     Chief Complaint: Pain of the Right Arm and Pain of the Left Arm    History of Present Illness:   Nathalia Syed is a 64 year old, right handed female who presents today for evaluation of bilateral arm pain. Patient reports for the past 4 years she has experienced bilateral arm pain that has worsened recently. She has \"deep bone pain\" in bilateral upper extremities, specifically in her neck, shoulders, elbows and wrists. No specific injury or trauma. Nathalia has pain daily; pain occurs depending on her activity for the day. She is unable to lift objects for long periods of time, if she does lift an object, she notes a weakness and stiffness after putting the object down. She has nerve \"zaps\" from her elbow into her small fingers.      Of note she has had a history of neck pain and has been seen for this.  Stiffness persists in neck.  Previous workup has included cervical radiographs revealing cervical DDD.      No joint swelling. No systemic symptoms of fatigue.     Review of Systems:   A 10-point review of systems was obtained and is negative except for as noted in the HPI.     Medications:     Current Outpatient Medications:      ASPIRIN PO, Take 81 mg by mouth, Disp: , Rfl:      atorvastatin (LIPITOR) 20 MG tablet, Take 20 mg by mouth daily, Disp: , Rfl:      calcium carbonate (OS-MEGHAN 500 MG [place]. CA) 500 MG tablet, Take 1,000 mg by mouth 2 times daily, Disp: , Rfl:      denosumab (PROLIA) 60 MG/ML SOSY injection, Inject 60 mg Subcutaneous once, Disp: , Rfl:      Famciclovir (FAMVIR PO), Take 500 mg by mouth As needed, Disp: , Rfl:      MAGNESIUM OXIDE PO, Take 250 mg by mouth, Disp: , Rfl:      Omega-3 Fatty Acids (FISH OIL PO), , Disp: " ", Rfl:      POTASSIUM CITRATE PO, , Disp: , Rfl:      VITAMIN D, CHOLECALCIFEROL, PO, Take 2,000 Units by mouth daily, Disp: , Rfl:     Allergies:  Tramadol; Reclast [zoledronic acid]; Aspirin; Codeine; and Rofecoxib     Past Medical History:  No past medical history on file.    Past Surgical History:  No past surgical history on file.     Social History:  Presents to clinic alone  Works as a teacher  Tobacco Use: none  PCP: SIGRID BRENNAN    Family History:  No family history on file.    Physical Examination:  Height 1.638 m (5' 4.5\"), weight 65.3 kg (144 lb).  General  - normal appearance, in no obvious distress  CV  - normal peripheral perfusion  Pulm  - normal respiratory pattern, non-labored  Musculoskeletal - cervical spine  - inspection: normal bone and joint alignment, no obvious kyphosis  - palpation: bilateral trapezius tenderness, no paravertebral or bony tenderness  - ROM: normal and painless extension, pain with rotation, limited rotation bilaterally, limited side bending bilaterally, pain with flexion  - strength: upper extremities 5/5 in all planes at shoulders, elbows, wrist and hand intrinsics.  - special tests:  (-) Spurling bilaterally  (-) Cleveland's reflex  Neuro  - C5-7 DTRs 2+ bilaterally, no sensory or motor deficit, grossly normal coordination, normal muscle tone  Skin  - no ecchymosis, erythema, warmth, or induration, no obvious rash  Psych  - interactive, appropriate, normal mood and affect    Imaging:   Radiographs of the cervical spine - 2/3 views (10/05/2018)  Impression: Multilevel cervical spondylosis, worst at C5-C6 with moderate disc space narrowing and slight reversal of the normal cervical lordosis.   CONNOR VALENCIA MD    I have independently reviewed the above imaging studies; the results were discussed with the patient.     Assessment:   64 year old, right handed female with past medical history of cervicalgia as well as cervical spine DDD presenting with nonspecific upper arm " weakness and intermittent sharp pain of bilateral upper extremities. Full strength on exam today. Etiology is not clear, however we discussed that at this time the most likely reason for her symptoms would be central nerve compression in the cervical spine. Start with cervical imaging and if negative may consider blood testing; esr/crp/ck and /or EMG.    Plan:     Cervical spine MRI    Symptom Diary    Follow up: Contact Nathalia to discuss results and further treatment    It was a pleasure seeing Nathalia today.    Inder Patton DO, [facility]  Primary Care Sports Medicine    Scribe Disclosure:  I, Amy Montejo, am serving as a scribe to document services personally performed by Inder Patton DO at this visit, based upon the provider's statements to me. All documentation has been reviewed by the aforementioned provider prior to being entered into the official medical record.

## 2019-10-18 NOTE — LETTER
10/18/2019       RE: Nathalia Syed  [address]  [facility] [address]     Dear Colleague,    Thank you for referring your patient, Nathalia Syed, to the [facility] at [facility]. Please see a copy of my visit note below.    [facility]  Endocrinology  Kavitha Casey MD  10/18/2019      Chief Complaint:   Osteoporosis     History of Present Illness:   Nathalia Syed is a 64 year old female with a history of osteoporosis who presents for evaluation of osteoporosis. Summaries per previous note.    #1 osteoporosis s/p 2 years of Forteo ending in Sept 2018 and start Prolia in October 2018. Pt is intolerant of Reclast.   Patient was initially seen in 2015 and noted to have osteoporosis. She underwent menopause in 2007 and has not been on hormone replacement therapy. Patient started Forteo 2016. She was scheduled for her first dose of Prolia on 10/5/18 as she had already taken 2 years of Forteo and doesn't tolerate Reclast. Labs were also drawn.    Interval History  The patient has had continued bone and muscle pain, but denies any new fractures. She has had no issues with Prolia. She had a DEXA performed on 10/18/19 which showed osteoporosis and a 7.5% increase in bone density of her spine compared to 2018 DEXA.  There is no significant change at the level of the hips, however there was a trend to increasing the bone density. She is not having any dental work performed soon.    #2 hypertension:   In 2017 patient noted increasing blood pressures. She was told to follow up with her primary care provider.    Interval History  Her blood pressure appears to fluctuate without clear pattern. She was started on Lipitor recently.    #3 headaches and dizziness:   Since January 2017, the patient reports a history of headaches, eye pain, and dizziness.  She had an EEG in September 2017 which was unremarkable.  There was no clear exacerbating or relieving  "factors.  She reports that the intermittent head pain can be bilateral.  She's never had a head MRI.  She is uncertain whether her symptoms are necessarily related to her Forteo, although she was on the Forteo without symptoms for roughly 3 months.    Interval History  She has some slight dizziness intermittently, which have become more frequent.    #4 anxiety:  Patient started on Cymbalta in 2017. She did not find this helpful. Her blood pressure has been in the systolic 120-130 range. She was taken of Cymbalta during her previous visit.    Interval History  She has had no issues with anxiety since her previous visit.    #5  Musculoskeletal concerns  For the past year, she reports pain on her right hip that shoots across the front of her pelvis.  This last for several minutes and inhibits her ability to walk.  This happens roughly once per month.  This resolves with cessation of movement.  This does not happen at night.  In addition, she also reports pain in her hands and fingers. She was told to follow up with orthopedics.    Interval History  The patien now reports bilaterally upper arm and shoulder weakness. She developed left arm pain since her previous visit, and later developed \"deep\" pain \"in the bone and nerve\" in her right arm as well. Now her pain is radiating to her low neck. She has sharp nerve pain. This pain is intermittent but is becoming more frequent. Her pain is most prominent after carrying even a light object. She denies any leg pain, but has sole numbness and numbness in her 1st and 2nd toes. She is not woken up at night due to the pain, but feels very sore in the morning. This pain is present about 10% of the day and is not worse at the morning or evening.     #6 Heart Murmur  The patient reportedly had a heart murmur in the ED one month ago when being evaluated for sickness after travelling. She is concerned if this is something that can come and go.    Review of Systems:   Pertinent items " "are noted in HPI, remainder of complete ROS is negative.       Active Medications:     Current Outpatient Medications:      atorvastatin (LIPITOR) 20 MG tablet, Take 20 mg by mouth daily, Disp: , Rfl:      denosumab (PROLIA) 60 MG/ML SOSY injection, Inject 60 mg Subcutaneous once, Disp: , Rfl:      Famciclovir (FAMVIR PO), Take 500 mg by mouth As needed, Disp: , Rfl:      MAGNESIUM OXIDE PO, Take 250 mg by mouth, Disp: , Rfl:      Omega-3 Fatty Acids (FISH OIL PO), , Disp: , Rfl:      POTASSIUM CITRATE PO, , Disp: , Rfl:      VITAMIN D, CHOLECALCIFEROL, PO, Take 2,000 Units by mouth daily, Disp: , Rfl:      ASPIRIN PO, Take 81 mg by mouth, Disp: , Rfl:      calcium carbonate (OS-MEGHAN 500 MG [place]. [place]) 500 MG tablet, Take 1,000 mg by mouth 2 times daily, Disp: , Rfl:       Allergies:   Tramadol; Reclast [zoledronic acid]; Aspirin; Codeine; and Rofecoxib      Past Medical History:  Osteoporosis  Dysphagia     Past Surgical History:  The patient has no known surgical history.    Family History:   No family history on file.      Social History:   Social History     Tobacco Use     Smoking status: Never Smoker     Smokeless tobacco: Never Used   Substance Use Topics     Alcohol use: Not on file     Drug use: Not on file        Physical Exam:   BP (!) 146/88   Pulse 72   Ht 1.638 m (5' 4.5\")   Wt 65.4 kg (144 lb 3.2 oz)   BMI 24.37 kg/m       GENERAL APPEARANCE: Alert and no distress  NECK: No lymphadenopathy appreciated  Thyroid: No obvious nodules palpated   CV: RRR without M/R/G  Lungs: CTA bilaterally  Abdomen: Soft, Nontender, non distended, positive bowel sounds   Neuro: Mild weakness in  Upper arm, at the level of both shoulders/proximal arms.   Mood: Normal   Lymph: neg in neck and supraclavicular area    Data:  DEXA Hip/Pelvis/Spine 10/18/19   Data read by Dr. Casey. Official read pending.  Lowest T-score of -3.2  She has had 7.5% increase in spinal bone density compared to 2018 DEXA.    Assessment and Plan:  #1 " osteoporosis s/p 2 years of Forteo ending in Sept 2018 and start Prolia in October 2018. Pt is intolerant of Reclast.   Pt to continue with Prolia for now. Her bone density has increased by 6.9% at the lumbar spine but she continues to have osteoporosis with lowest t-score of -3.2. We will consider Evenity if her bone density decreases while taking Prolia or if he develops fracture while taking Prolia.  - ORTHO  REFERRAL    #2 hypertension  Followed by her primary care provider.    #3 headaches and dizziness  No new medication changes.    #4 anxiety  No new issues with anxiety.    #5  Musculoskeletal concerns  Follow with orthopedics  - ORTHO  REFERRAL    #6 Heart Murmur  Observe for now.    The patient was given a flu shot today.     Follow-up: Return in about 1 year (around 10/18/2020).        Scribe Disclosure:  I, Brandon Zuluaga, am serving as a scribe to document services personally performed by Kavitha Casey MD at this visit, based upon the provider's statements to me. All documentation has been reviewed by the aforementioned provider prior to being entered into the official medical record.     Portions of this medical record were completed by a scribe. UPON MY REVIEW AND AUTHENTICATION BY ELECTRONIC SIGNATURE, this confirms (a) I performed the applicable clinical services, and (b) the record is accurate.        Again, thank you for allowing me to participate in the care of your patient.      Sincerely,    Kavitha Casey MD

## 2019-10-18 NOTE — PROGRESS NOTES
SPORTS & ORTHOPEDIC WALK-IN VISIT 10/18/2019    Primary Care Physician: Dr. Nicholson    Pain started about 4 years ago with pain in both arms. She stated that pain is deep, she occasionally gets nerve pain as well as what feels like bone pain. She has osteoporosis and previously broke right arm. She is starting to get osteoarthritis in her hands. Unable to lift things for long periods of time. She also feels like her arms overall are getting weaker.     Reason for visit:     What part of your body is injured / painful?  bilateral arms    What caused the injury /pain? Unsure    How long ago did your injury occur or pain begin? several years ago    What are your most bothersome symptoms? Pain and Weakness    How would you characterize your symptom?  Sharp nerve pain; deep pain    What makes your symptoms better? Rest and tylenol     What makes your symptoms worse? Other: prolonged holding and lifting     Have you been previously seen for this problem? Yes, 1 year ago    Medical History:    Any recent changes to your medical history? No    Any new medication prescribed since last visit? Yes, cholesterol medication    Have you had surgery on this body part before? No    Social History:    Occupation:     Handedness: Right    Exercise: Walk    Review of Systems:    Do you have fever, chills, weight loss? No    Do you have any vision problems? No    Do you have any chest pain or edema? No    Do you have any shortness of breath or wheezing?  No    Do you have stomach problems? Yes, GI    Do you have any numbness or focal weakness? Yes, feet and toes    Do you have diabetes? No    Do you have problems with bleeding or clotting? No    Do you have an rashes or other skin lesions? No

## 2019-10-18 NOTE — PROGRESS NOTES
"[facility]  Orthopedics  Inder Patton, DO  10/18/2019     Name: Nathalia Syed  MRN: [id number]  Age: 64 year old  : 1955  Referring provider: Referred Self     Chief Complaint: Pain of the Right Arm and Pain of the Left Arm    History of Present Illness:   Nathalia Syed is a 64 year old, right handed female who presents today for evaluation of bilateral arm pain. Patient reports for the past 4 years she has experienced bilateral arm pain that has worsened recently. She has \"deep bone pain\" in bilateral upper extremities, specifically in her neck, shoulders, elbows and wrists. No specific injury or trauma. Nathalia has pain daily; pain occurs depending on her activity for the day. She is unable to lift objects for long periods of time, if she does lift an object, she notes a weakness and stiffness after putting the object down. She has nerve \"zaps\" from her elbow into her small fingers.      Of note she has had a history of neck pain and has been seen for this.  Stiffness persists in neck.  Previous workup has included cervical radiographs revealing cervical DDD.      No joint swelling. No systemic symptoms of fatigue.     Review of Systems:   A 10-point review of systems was obtained and is negative except for as noted in the HPI.     Medications:     Current Outpatient Medications:      ASPIRIN PO, Take 81 mg by mouth, Disp: , Rfl:      atorvastatin (LIPITOR) 20 MG tablet, Take 20 mg by mouth daily, Disp: , Rfl:      calcium carbonate (OS-MEGHAN 500 MG [place]. [place]) 500 MG tablet, Take 1,000 mg by mouth 2 times daily, Disp: , Rfl:      denosumab (PROLIA) 60 MG/ML SOSY injection, Inject 60 mg Subcutaneous once, Disp: , Rfl:      Famciclovir (FAMVIR PO), Take 500 mg by mouth As needed, Disp: , Rfl:      MAGNESIUM OXIDE PO, Take 250 mg by mouth, Disp: , Rfl:      Omega-3 Fatty Acids (FISH OIL PO), , Disp: , Rfl:      POTASSIUM CITRATE PO, , Disp: , Rfl:      VITAMIN D, CHOLECALCIFEROL, PO, Take 2,000 Units " "by mouth daily, Disp: , Rfl:     Allergies:  Tramadol; Reclast [zoledronic acid]; Aspirin; Codeine; and Rofecoxib     Past Medical History:  No past medical history on file.    Past Surgical History:  No past surgical history on file.     Social History:  Presents to clinic alone  Works as a teacher  Tobacco Use: none  PCP: SIGRID BRENNAN    Family History:  No family history on file.    Physical Examination:  Height 1.638 m (5' 4.5\"), weight 65.3 kg (144 lb).  General  - normal appearance, in no obvious distress  CV  - normal peripheral perfusion  Pulm  - normal respiratory pattern, non-labored  Musculoskeletal - cervical spine  - inspection: normal bone and joint alignment, no obvious kyphosis  - palpation: bilateral trapezius tenderness, no paravertebral or bony tenderness  - ROM: normal and painless extension, pain with rotation, limited rotation bilaterally, limited side bending bilaterally, pain with flexion  - strength: upper extremities 5/5 in all planes at shoulders, elbows, wrist and hand intrinsics.  - special tests:  (-) Spurling bilaterally  (-) Cleveland's reflex  Neuro  - C5-7 DTRs 2+ bilaterally, no sensory or motor deficit, grossly normal coordination, normal muscle tone  Skin  - no ecchymosis, erythema, warmth, or induration, no obvious rash  Psych  - interactive, appropriate, normal mood and affect    Imaging:   Radiographs of the cervical spine - 2/3 views (10/05/2018)  Impression: Multilevel cervical spondylosis, worst at C5-C6 with moderate disc space narrowing and slight reversal of the normal cervical lordosis.   CONNOR VALENCIA MD    I have independently reviewed the above imaging studies; the results were discussed with the patient.     Assessment:   64 year old, right handed female with past medical history of cervicalgia as well as cervical spine DDD presenting with nonspecific upper arm weakness and intermittent sharp pain of bilateral upper extremities. Full strength on exam today. " Etiology is not clear, however we discussed that at this time the most likely reason for her symptoms would be central nerve compression in the cervical spine. Start with cervical imaging and if negative may consider blood testing; esr/crp/ck and /or EMG.    Plan:     Cervical spine MRI    Symptom Diary    Follow up: Contact Nathalia to discuss results and further treatment    It was a pleasure seeing Nathalia today.    Inder Patton DO, [facility]  Primary Care Sports Medicine      Scribe Disclosure:  I, Amy Montejo, am serving as a scribe to document services personally performed by Inder Patton DO at this visit, based upon the provider's statements to me. All documentation has been reviewed by the aforementioned provider prior to being entered into the official medical record.

## 2019-10-29 ENCOUNTER — TELEPHONE (OUTPATIENT)
Dept: ENDOCRINOLOGY | Facility: CLINIC | Age: 64
End: 2019-10-29

## 2019-10-29 NOTE — LETTER
Patient:  Nathalia Syed  :   1955  MRN:     [id number]        Ms.Nathalia Syed  [address]  [place] [address]        2019    To Whom It May Concern    I am the endocrinologist caring for Nathalia.  she has significant osteoporosis.  During her visit with me in 2019, I noted that she had significant upper extremity weakness which was concerning.  I  therefore referred her to our sports medicine clinic, which happened to have same-day availability.  They were able to assess her in order of the cervical spine MRI which showed significant stenosis likely causing the upper extremity weakness.  This evaluation was clinically indicated and I would recommend insurance coverage for the MRI, sports  medicine clinic referral, and any treatment related to this evaluation.    As it currently stands, she is unable to use both upper extremities for any prolonged period of time, which inhibits her ability to function.  Therefore further evaluation and management was clinically indicated.  Please let me know if you have any questions or concerns.    If you have any questions, please feel free to contact my nurse at [phone] select option #3 for triage nurse.    Regards    Kavitha Casey MD

## 2019-11-04 ENCOUNTER — TELEPHONE (OUTPATIENT)
Dept: ORTHOPEDICS | Facility: CLINIC | Age: 64
End: 2019-11-04

## 2019-11-04 NOTE — TELEPHONE ENCOUNTER
Contacted Nathalia about where she would like her Physical Therapy referral to go to in [place]. She would like [facility]. She is going to contact insurance and get back to us.

## 2019-12-13 ENCOUNTER — TRANSCRIBE ORDERS (OUTPATIENT)
Dept: OTHER | Age: 64
End: 2019-12-13

## 2019-12-13 DIAGNOSIS — M54.12 CERVICAL RADICULOPATHY: ICD-10-CM

## 2019-12-13 DIAGNOSIS — M81.0 OSTEOPOROSIS WITHOUT CURRENT PATHOLOGICAL FRACTURE, UNSPECIFIED OSTEOPOROSIS TYPE: Primary | ICD-10-CM

## 2019-12-16 ENCOUNTER — TELEPHONE (OUTPATIENT)
Dept: ENDOCRINOLOGY | Facility: CLINIC | Age: 64
End: 2019-12-16

## 2019-12-16 NOTE — TELEPHONE ENCOUNTER
[facility] Call Center    Phone Message    May a detailed message be left on voicemail: yes    Reason for Call: Other: Pt is calling requesting a call from Dr. Casey.  Last time pt saw Dr. Csaey, Dr. Casey had pt go see Dr. Patton and now pt has a 1,000.00 bill and would like Dr. Casey to call her PCP so her PCC will sign off on it.  For further clarification, please call the pt     Action Taken: Message routed to:  [facility] [place]

## 2019-12-17 NOTE — TELEPHONE ENCOUNTER
Letter written by  has been faxed over to insurance per patient she will contact us with any further information.

## 2019-12-17 NOTE — TELEPHONE ENCOUNTER
Health Call Center    Phone Message    May a detailed message be left on voicemail: yes    Reason for Call: Other: Pt is having trouble getting Dr Paulson bill covered by insurance.  Pt requests we fax a copy of the letter in her file from Dr Casey regarding her recommendation to see Dr Carpio to her [place] doctor Dr Nicholson at [facility]  fax: [phone]       Please contact Pt to verify fax has been sent    Action Taken: Message routed to:  [facility] endocrinology

## 2020-02-27 ENCOUNTER — TELEPHONE (OUTPATIENT)
Dept: ENDOCRINOLOGY | Facility: CLINIC | Age: 65
End: 2020-02-27

## 2020-02-27 NOTE — TELEPHONE ENCOUNTER
Spoke with Dr. Nicholson's nurse.  Patient to get Prolia every 6 months.  This will be done in [place].  It does not look like she received her Prolia shot here in October 2019.    Her recent bone density in October 2019 had shown improvement while on the Prolia.  She should continue with the Prolia.  This is easier for her to do locally, then we will encourage her to do so.    --      They would  like our notes faxed over to fax number [phone] and call [phone] to confirm.    --    Called Dr. Nicholson's nurse at [facility] - left message.     --    They would  like our notes faxed over to fax number [phone] and call [phone] to confirm.        --    Her last Prolia shot with with me in October 9, 2018.  She should be getting her Prolia shot every 6 months.

## 2020-03-02 ENCOUNTER — TELEPHONE (OUTPATIENT)
Dept: ENDOCRINOLOGY | Facility: CLINIC | Age: 65
End: 2020-03-02

## 2020-03-03 NOTE — TELEPHONE ENCOUNTER
----- Message from Kavitha Casey MD sent at 3/2/2020  2:42 PM CST -----  Fax all of my notes from 2019 and 2020 to her local clinic: Information below.     fax number [phone] and call [phone] to confirm.

## 2020-03-03 NOTE — TELEPHONE ENCOUNTER
Chart notes faxed over to [facility] at [phone] called [phone] to confirm chart notes have been received no answer so I left a voicemail to call if chart notes have not been received [phone].

## 2020-03-26 ENCOUNTER — TELEPHONE (OUTPATIENT)
Dept: ENDOCRINOLOGY | Facility: CLINIC | Age: 65
End: 2020-03-26

## 2020-08-07 ENCOUNTER — TELEPHONE (OUTPATIENT)
Dept: ENDOCRINOLOGY | Facility: CLINIC | Age: 65
End: 2020-08-07

## 2020-08-07 DIAGNOSIS — M81.0 OSTEOPOROSIS WITHOUT CURRENT PATHOLOGICAL FRACTURE, UNSPECIFIED OSTEOPOROSIS TYPE: Primary | ICD-10-CM

## 2020-08-07 NOTE — LETTER
Patient:  Nathalia Syed  :   1955  MRN:     [id number]        Ms.Kathy Annie Syed  [address] [place] [address]        2020    Dear Mr. Delong    This letter is on behalf of Nathalia.  I am one of the endocrinologist working with her. She is now doing better but just needs to avoid risk for falls. She has been very compliant with the treatment program. She has the following issues:     #1 osteoporosis with fracture  This patient has osteoporosis with subsequent fracture.  She suffered multiple fractures and therefore is at high risk for future fractures even though she is complying with treatment.  I would recommend that accommodations be made to reduce her risk for fractures    #2 limitations created by health condition  I would recommend that the patient cannot lift more than 10 pounds as well as precautions  to reduce fall risks.    #3 accommodations adjusting to the work environment or schedule  I would recommend that the patient not be placed at risk for falls. 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 I anticipate that this would only take about 3-5% of her effort as she is able to function within the classroom.  She is otherwise functional and able to work and this very minor assistance (if needed) should not interfere with her ability to perform her job. I will be reassessing her yearly (she has an upcoming appointment).    If you have any questions, please feel free to contact my nurse at [phone] select option #3 for triage nurse.    Regards      Kavitha Casey MD, MS

## 2020-08-10 ENCOUNTER — TELEPHONE (OUTPATIENT)
Dept: ENDOCRINOLOGY | Facility: CLINIC | Age: 65
End: 2020-08-10

## 2020-08-10 NOTE — TELEPHONE ENCOUNTER
Letter and Dexa order emailed to patient at [email or web address].

## 2020-08-10 NOTE — TELEPHONE ENCOUNTER
----- Message from Essence Davis MA sent at 8/10/2020  8:57 AM CDT -----    ----- Message -----  From: Kavitha Casey MD  Sent: 8/7/2020   3:42 PM CDT  To: [facility]    Please have pt do dxa locally before return visit - orders written    Please email my letter from today to pt - [email or web address] - please to this asap for pt

## 2020-09-28 ENCOUNTER — DOCUMENTATION ONLY (OUTPATIENT)
Dept: CARE COORDINATION | Facility: CLINIC | Age: 65
End: 2020-09-28

## 2020-11-08 ENCOUNTER — HEALTH MAINTENANCE LETTER (OUTPATIENT)
Age: 65
End: 2020-11-08

## 2020-11-12 RX ORDER — SPIRONOLACTONE 50 MG/1
50 TABLET, FILM COATED ORAL
COMMUNITY
Start: 2020-08-12 | End: 2021-08-17

## 2020-11-12 NOTE — PROGRESS NOTES
"Nathalia Syed is a 65 year old female who is being evaluated via a billable video visit.      The patient has been notified of following:     \"This video visit will be conducted via a call between you and your physician/provider. We have found that certain health care needs can be provided without the need for an in-person physical exam.  This service lets us provide the care you need with a video conversation.  If a prescription is necessary we can send it directly to your pharmacy.  If lab work is needed we can place an order for that and you can then stop by our lab to have the test done at a later time.    Video visits are billed at different rates depending on your insurance coverage.  Please reach out to your insurance provider with any questions.    If during the course of the call the physician/provider feels a video visit is not appropriate, you will not be charged for this service.\"    Patient has given verbal consent for Video visit? Yes  How would you like to obtain your AVS? MyChart  If you are dropped from the video visit, the video invite should be resent to: mychart  Will anyone else be joining your video visit? No        Video-Visit Details    Type of service:  Video Visit  [facility]  Endocrinology  Kavitha Casey MD  11/13/20   Chief Complaint:   Video Visit     This was a video visit conducted by Kevin.  Start time 106.  Stop time 135.  Documentation time 11 minutes including contacting patient's primary provider.  Total time 40 minutes.    History of Present Illness:   Nathalia Syed is a 65 year old female with a history of osteoporosis who presents for evaluation of osteoporosis. Summaries per previous note.    #1 osteoporosis s/p 2 years of Forteo ending in Sept 2018 and start Prolia in October 2018. Pt is intolerant of Reclast.   Patient was initially seen in 2015 and noted to have osteoporosis. She underwent menopause in 2007 and has not been on hormone replacement therapy. " Patient started Forteo 2016. She was scheduled for her first dose of Prolia on 10/5/18 as she had already taken 2 years of Forteo and doesn't tolerate Reclast. She had a DEXA performed on 10/18/19 which showed osteoporosis and a 7.5% increase in bone density of her spine compared to 2018 DEXA.     Interval History  The patient had a repeat bone density performed in August 2020 that showed an increase in bone density of about 6.8% of the lumbar spine and 14.8% at the level of the hip compared with 2017.  Lowest T score was -2.5 at the right hip.  Patient continues on Prolia locally every 6 months.    #2 hypertension:   In 2017 patient noted increasing blood pressures. She was told to follow up with her primary care provider.    Interval History  Per patient report, she is currently on Lipitor and her blood pressure has been good.    #3 Musculoskeletal concerns  At her visit last year, she reports significant bilateral upper arm and shoulder weakness with associated pain.  She has been by sports medicine and underwent physical therapy.  After about 7 months of physical therapy, the symptoms have improved.    #4 Heart Murmur  The patient reportedly had a heart murmur in the ED one month ago when being evaluated for sickness after travelling. She is concerned if this is something that can come and go.    Interval history: Not discussed today.    #5 concern about increased fatigue/emotional lability/weight gain/dry skin/brittle nails/hot flashes  The patient reports generally feeling poorly for about 2 years.  She reports group of symptoms including weight gain, increased emotional lability, fatigue, dry skin, brittle nails, intermittent hot flashes, but just generally feeling poorly.  She is concerned that there may be a hormonal reason for her symptoms.  Recent TSH done in 2020 was normal.    Review of Systems:   Pertinent items are noted in HPI, remainder of complete ROS is negative.         Active Medications:  "    Current Outpatient Medications:      atorvastatin (LIPITOR) 20 MG tablet, Take 20 mg by mouth daily, Disp: , Rfl:      calcium carbonate (OS-MEGHAN 500 MG [place]. [place]) 500 MG tablet, Take 1,000 mg by mouth 2 times daily, Disp: , Rfl:      denosumab (PROLIA) 60 MG/ML SOSY injection, Inject 60 mg Subcutaneous once, Disp: , Rfl:      Omega-3 Fatty Acids (FISH OIL PO), , Disp: , Rfl:      spironolactone (ALDACTONE) 50 MG tablet, Take 50 mg by mouth, Disp: , Rfl:      VITAMIN D, CHOLECALCIFEROL, PO, Take 2,000 Units by mouth daily, Disp: , Rfl:      Famciclovir (FAMVIR PO), Take 500 mg by mouth As needed, Disp: , Rfl:      MAGNESIUM OXIDE PO, Take 250 mg by mouth, Disp: , Rfl:      POTASSIUM CITRATE PO, , Disp: , Rfl:       Allergies:   Tramadol; Reclast [zoledronic acid]; Aspirin; Codeine; and Rofecoxib      Past Medical History:  Osteoporosis  Dysphagia     Past Surgical History:  The patient has no known surgical history.    Family History:   No family history on file.      Social History:   Social History     Tobacco Use     Smoking status: Never Smoker     Smokeless tobacco: Never Used   Substance Use Topics     Alcohol use: Not on file     Drug use: Not on file        Physical Exam:   GENERAL: Healthy, alert and no distress\",\"EYES: Eyes grossly normal to inspection.  No discharge or erythema, or obvious scleral/conjunctival abnormalities.\",\"RESP: No audible wheeze, cough, or visible cyanosis.  No visible retractions or increased work of breathing.  \",\"SKIN: Visible skin clear. No significant rash, abnormal pigmentation or lesions.\",\"NEURO: Cranial nerves grossly intact.  Mentation and speech appropriate for age.\",\"PSYCH: Mentation appears normal, affect normal/bright, judgement and insight intact, normal speech and appearance well-groomed.    Data:   August 2020 that showed an increase in bone density of about 6.8% of the lumbar spine and 14.8% at the level of the hip compared with 2017.  Lowest T score was -3.0 at " the lumbar spine.  The right hip had a T score of -2.5.    Assessment and Plan:  #1 osteoporosis s/p 2 years of Forteo ending in Sept 2018 and start Prolia in October 2018. Pt is intolerant of Reclast.   Her bone density has continued to increase over time although she continues to have osteoporosis.  However I am encouraged by the gain in her bone of 6.8% of the lumbar spine at around 15% at the hip.  I will let the reviewing radiologist note to clarify the right/left hip issue.  At any rate, the patient should continue with her Prolia every 6 months and she will get this done locally.  I will let her provider know.    #2 hypertension  Followed by her primary care provider.  Better per patient report    #3 Heart Murmur  Not discussed at the current visit.    #4 concern about increased fatigue/emotional lability/weight gain/dry skin/brittle nails/hot flashes  The patient has a constellation of symptoms which certainly could be related to endocrine issues.  However her recent work up has been fairly normal.  In addition, it is unusual for post menopausal women to have the symptoms recur so long after undergoing menopause.     I am thinking potentially the patient may benefit from a sleep eval.  At any rate, I think the patient could try alpha lipoic acid at 600 mg daily.  She will try this for a few weeks and if this is not helpful, she can try low-dose Cymbalta.  We will touch base with the patient about her tolerance of the alpha lipoic acid.  I will discuss with her primary provider about how further work-up should be arranged.      Plan for return visit in 1 year.    This was a video visit conducted by Kevin.  Start time 106.  Stop time 135.  Documentation time 11 minutes including contacting patient's primary provider.  Total time 40 minutes.

## 2020-11-13 ENCOUNTER — VIRTUAL VISIT (OUTPATIENT)
Dept: ENDOCRINOLOGY | Facility: CLINIC | Age: 65
End: 2020-11-13
Payer: COMMERCIAL

## 2020-11-13 DIAGNOSIS — Z92.29 PERSONAL HISTORY OF OTHER DRUG THERAPY: Primary | ICD-10-CM

## 2020-11-13 PROCEDURE — 99215 OFFICE O/P EST HI 40 MIN: CPT | Mod: 95 | Performed by: INTERNAL MEDICINE

## 2020-11-13 RX ORDER — PERPHENAZINE 16 MG
600 TABLET ORAL DAILY
Qty: 30 CAPSULE | Refills: 1 | Status: SHIPPED | OUTPATIENT
Start: 2020-11-13

## 2020-11-13 NOTE — PATIENT INSTRUCTIONS
- rosuvastatin 5 mg PO nightly.    Alpha lipoic acid 600 mg daily    Consider cymbalta if alpha lipoic acid is not helpful.

## 2020-11-13 NOTE — LETTER
"11/13/2020       RE: Nathalia Syed  [address] [place] [address]     Dear Colleague,    Thank you for referring your patient, Nathalia Syed, to the [facility] ENDOCRINOLOG[facility] [place] [facility]. Please see a copy of my visit note below.    Nathalia Syed is a 65 year old female who is being evaluated via a billable video visit.      The patient has been notified of following:     \"This video visit will be conducted via a call between you and your physician/provider. We have found that certain health care needs can be provided without the need for an in-person physical exam.  This service lets us provide the care you need with a video conversation.  If a prescription is necessary we can send it directly to your pharmacy.  If lab work is needed we can place an order for that and you can then stop by our lab to have the test done at a later time.    Video visits are billed at different rates depending on your insurance coverage.  Please reach out to your insurance provider with any questions.    If during the course of the call the physician/provider feels a video visit is not appropriate, you will not be charged for this service.\"    Patient has given verbal consent for Video visit? Yes  How would you like to obtain your AVS? MyChart  If you are dropped from the video visit, the video invite should be resent to: mychart  Will anyone else be joining your video visit? No        Video-Visit Details    Type of service:  Video Visit  [facility]  Endocrinology  Kavitha Casey MD  11/13/20   Chief Complaint:   Video Visit     This was a video visit conducted by Kevin.  Start time 106.  Stop time 135.  Documentation time 11 minutes including contacting patient's primary provider.  Total time 40 minutes.    History of Present Illness:   Nathalia Syed is a 65 year old female with a history of osteoporosis who presents for evaluation " of osteoporosis. Summaries per previous note.    #1 osteoporosis s/p 2 years of Forteo ending in Sept 2018 and start Prolia in October 2018. Pt is intolerant of Reclast.   Patient was initially seen in 2015 and noted to have osteoporosis. She underwent menopause in 2007 and has not been on hormone replacement therapy. Patient started Forteo 2016. She was scheduled for her first dose of Prolia on 10/5/18 as she had already taken 2 years of Forteo and doesn't tolerate Reclast. She had a DEXA performed on 10/18/19 which showed osteoporosis and a 7.5% increase in bone density of her spine compared to 2018 DEXA.     Interval History  The patient had a repeat bone density performed in August 2020 that showed an increase in bone density of about 6.8% of the lumbar spine and 14.8% at the level of the hip compared with 2017.  Lowest T score was -2.5 at the right hip.  Patient continues on Prolia locally every 6 months.    #2 hypertension:   In 2017 patient noted increasing blood pressures. She was told to follow up with her primary care provider.    Interval History  Per patient report, she is currently on Lipitor and her blood pressure has been good.    #3 Musculoskeletal concerns  At her visit last year, she reports significant bilateral upper arm and shoulder weakness with associated pain.  She has been by sports medicine and underwent physical therapy.  After about 7 months of physical therapy, the symptoms have improved.    #4 Heart Murmur  The patient reportedly had a heart murmur in the ED one month ago when being evaluated for sickness after travelling. She is concerned if this is something that can come and go.    Interval history: Not discussed today.    #5 concern about increased fatigue/emotional lability/weight gain/dry skin/brittle nails/hot flashes  The patient reports generally feeling poorly for about 2 years.  She reports group of symptoms including weight gain, increased emotional lability, fatigue, dry  "skin, brittle nails, intermittent hot flashes, but just generally feeling poorly.  She is concerned that there may be a hormonal reason for her symptoms.  Recent TSH done in 2020 was normal.    Review of Systems:   Pertinent items are noted in HPI, remainder of complete ROS is negative.         Active Medications:     Current Outpatient Medications:      atorvastatin (LIPITOR) 20 MG tablet, Take 20 mg by mouth daily, Disp: , Rfl:      calcium carbonate (OS-MEGHAN 500 MG [place]. [place]) 500 MG tablet, Take 1,000 mg by mouth 2 times daily, Disp: , Rfl:      denosumab (PROLIA) 60 MG/ML SOSY injection, Inject 60 mg Subcutaneous once, Disp: , Rfl:      Omega-3 Fatty Acids (FISH OIL PO), , Disp: , Rfl:      spironolactone (ALDACTONE) 50 MG tablet, Take 50 mg by mouth, Disp: , Rfl:      VITAMIN D, CHOLECALCIFEROL, PO, Take 2,000 Units by mouth daily, Disp: , Rfl:      Famciclovir (FAMVIR PO), Take 500 mg by mouth As needed, Disp: , Rfl:      MAGNESIUM OXIDE PO, Take 250 mg by mouth, Disp: , Rfl:      POTASSIUM CITRATE PO, , Disp: , Rfl:       Allergies:   Tramadol; Reclast [zoledronic acid]; Aspirin; Codeine; and Rofecoxib      Past Medical History:  Osteoporosis  Dysphagia     Past Surgical History:  The patient has no known surgical history.    Family History:   No family history on file.      Social History:   Social History     Tobacco Use     Smoking status: Never Smoker     Smokeless tobacco: Never Used   Substance Use Topics     Alcohol use: Not on file     Drug use: Not on file        Physical Exam:   GENERAL: Healthy, alert and no distress\",\"EYES: Eyes grossly normal to inspection.  No discharge or erythema, or obvious scleral/conjunctival abnormalities.\",\"RESP: No audible wheeze, cough, or visible cyanosis.  No visible retractions or increased work of breathing.  \",\"SKIN: Visible skin clear. No significant rash, abnormal pigmentation or lesions.\",\"NEURO: Cranial nerves grossly intact.  Mentation and speech appropriate for " "age.\",\"PSYCH: Mentation appears normal, affect normal/bright, judgement and insight intact, normal speech and appearance well-groomed.    Data:   August 2020 that showed an increase in bone density of about 6.8% of the lumbar spine and 14.8% at the level of the hip compared with 2017.  Lowest T score was -3.0 at the lumbar spine.  The right hip had a T score of -2.5.    Assessment and Plan:  #1 osteoporosis s/p 2 years of Forteo ending in Sept 2018 and start Prolia in October 2018. Pt is intolerant of Reclast.   Her bone density has continued to increase over time although she continues to have osteoporosis.  However I am encouraged by the gain in her bone of 6.8% of the lumbar spine at around 15% at the hip.  I will let the reviewing radiologist note to clarify the right/left hip issue.  At any rate, the patient should continue with her Prolia every 6 months and she will get this done locally.  I will let her provider know.    #2 hypertension  Followed by her primary care provider.  Better per patient report    #3 Heart Murmur  Not discussed at the current visit.    #4 concern about increased fatigue/emotional lability/weight gain/dry skin/brittle nails/hot flashes  The patient has a constellation of symptoms which certainly could be related to endocrine issues.  However her recent work up has been fairly normal.  In addition, it is unusual for post menopausal women to have the symptoms recur so long after undergoing menopause.     I am thinking potentially the patient may benefit from a sleep eval.  At any rate, I think the patient could try alpha lipoic acid at 600 mg daily.  She will try this for a few weeks and if this is not helpful, she can try low-dose Cymbalta.  We will touch base with the patient about her tolerance of the alpha lipoic acid.  I will discuss with her primary provider about how further work-up should be arranged.      Plan for return visit in 1 year.    This was a video visit conducted by " Kevin.  Start time 106.  Stop time 135.  Documentation time 11 minutes including contacting patient's primary provider.  Total time 40 minutes.    Again, thank you for allowing me to participate in the care of your patient.      Sincerely,    Kavitha Casey MD

## 2020-11-18 ENCOUNTER — TELEPHONE (OUTPATIENT)
Dept: ENDOCRINOLOGY | Facility: CLINIC | Age: 65
End: 2020-11-18

## 2021-04-27 ENCOUNTER — TELEPHONE (OUTPATIENT)
Dept: ENDOCRINOLOGY | Facility: CLINIC | Age: 66
End: 2021-04-27

## 2021-04-27 NOTE — LETTER
Patient:  Nathalia Syed  :   1955  MRN:     [id number]        Ms.Kathy Annie Syed  [address] [id number]        2021    Dear ,    I see that you do not have a follow-up appointment in endocrinology (Generally around 2021). I would recommend that you be evaluated by an endocrinologist to minimize complications. If you like to be seen at the [facility], please call [phone] select option #1 for an appointment. Please make sure you continue to get the prolia every 6 months at your local provider's office.     Regards    Kavitha Casey MD

## 2021-04-27 NOTE — TELEPHONE ENCOUNTER
Pt needs follow up - letter sent.   ----- Message from Kavitha Casey MD sent at 11/18/2020  2:22 PM CST -----  Dr giron get back to me?   ----- Message -----  From: Kavitha Casey MD  Sent: 11/13/2020   1:36 PM CST  To: Kavitha Casey MD    I need to call the patient's primary provider.  I need to recommend that patient continue with Prolia.  I need to talk with the DEXA physician about correcting the report to talk about the right hip (valve the left hip which is pending).  I will need to talk to her primary provider about her hormonal/endocrine evaluation and how to do this locally.

## 2021-07-17 ENCOUNTER — HEALTH MAINTENANCE LETTER (OUTPATIENT)
Age: 66
End: 2021-07-17

## 2021-08-09 ENCOUNTER — TELEPHONE (OUTPATIENT)
Dept: ENDOCRINOLOGY | Facility: CLINIC | Age: 66
End: 2021-08-09

## 2021-08-09 NOTE — TELEPHONE ENCOUNTER
[facility] Call Center    Phone Message    May a detailed message be left on voicemail: yes     Reason for Call: Form or Letter   Type or form/letter needing completion: Pt stated that the provider every year usually sends letter to her for her employer about her needing minor modifications for work. 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  Pt would like the letter to be mailed to her listed address, and also send to her in [employer]. Please call Pt to confirm that this letter will be sent to her.  Provider: lebron  Date form needed: asap  Once completed: Mail form to Name: Nathalia Syed, at Address: [address] [place] [id number]      Action Taken: Message routed to:  [facility] endo    Travel Screening: Not Applicable

## 2021-08-10 ENCOUNTER — TELEPHONE (OUTPATIENT)
Dept: ENDOCRINOLOGY | Facility: CLINIC | Age: 66
End: 2021-08-10

## 2021-08-10 NOTE — LETTER
Patient:  Nathlaia Syed  :   1955  MRN:     [id number]    Ms.Kathy Annie Syed  [address] [facility] [place] [address]    8/10/21    Dear Mr. Delong  This letter is on behalf of Nathalia.  I am one of the endocrinologist working with her. She is now doing better but just needs to avoid risk for falls. She has been very compliant with the treatment program. She has the following issues:     #1 osteoporosis with fracture  This patient has osteoporosis with subsequent fracture.  She suffered multiple fractures and therefore is at high risk for future fractures even though she is complying with treatment.  I would recommend that accommodations be made to reduce her risk for fractures    #2 limitations created by health condition  I would recommend that the patient cannot lift more than 10 pounds as well as precautions  to reduce fall risks.    #3 accommodations adjusting to the work environment or schedule  I would recommend that the patient not be placed at risk for falls. 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 I anticipate that this would only take about 3-5% of her effort as she is able to function within the classroom.  She is otherwise functional and able to work and this very minor assistance (if needed) should not interfere with her ability to perform her job. I will be reassessing her yearly (she has an upcoming appointment).    If you have any questions, please feel free to contact my nurse at [phone] select option #3 for triage nurse.  Regards      Kavitha Casey MD, MS

## 2021-08-19 ENCOUNTER — TELEPHONE (OUTPATIENT)
Dept: ENDOCRINOLOGY | Facility: CLINIC | Age: 66
End: 2021-08-19

## 2021-08-19 NOTE — TELEPHONE ENCOUNTER
Called pt - referral made for me for eval - called pt's primary provider for them to repeat DXA locally

## 2021-09-11 ENCOUNTER — HEALTH MAINTENANCE LETTER (OUTPATIENT)
Age: 66
End: 2021-09-11

## 2021-10-14 NOTE — PROGRESS NOTES
Video-Visit Details    Type of service:  Video Visit  [facility]  Endocrinology  Kavitha Casey MD  10/15/21  Chief Complaint:   Pt presents for follow up of osteoproosis    This was a video visit conducted by Kevin: Start time 1019, stop time 1038, documentation time, coordination of care, 10 minutes.  Total time spent day of the encounter 30 minutes.    History of Present Illness:   Nathalia Syed is a 66 year old female with a history of osteoporosis who presents for evaluation of osteoporosis. Summaries per previous note.    #1 osteoporosis s/p 2 years of Forteo ending in Sept 2018 and start Prolia in October 2018. Pt is intolerant of Reclast.   Patient was initially seen in 2015 and noted to have osteoporosis. She underwent menopause in 2007 and has not been on hormone replacement therapy. Patient started Forteo 2016. She was scheduled for her first dose of Prolia on 10/5/18 as she had already taken 2 years of Forteo and doesn't tolerate Reclast. She had a DEXA performed on 10/18/19 which showed osteoporosis and a 7.5% increase in bone density of her spine compared to 2018 DEXA.  August 2020 that showed an increase in bone density of about 6.8% of the lumbar spine and 14.8% at the level of the hip compared with 2017.  Lowest T score was -2.5 at the right hip.     Interval History  Pt understands the importance of Prolia every 6 months at her local clinic.  Denies any interim fractures.    #2 hypertension:   In 2017 patient noted increasing blood pressures. She was told to follow up with her primary care provider.    Interval History  Is now on spironolactone to treat her hypertension as well as her hair loss.  Per patient report, this has significantly improved.    #3 Musculoskeletal concerns  At her visit last year, she reports significant bilateral upper arm and shoulder weakness with associated pain.  She has been by sports medicine and underwent physical therapy.  After about 7 months of physical  therapy, the symptoms have improved.    Interval history: Patient is currently on alpha lipoic acid at 600 mg daily and feels that this has been helpful for her    #4 Heart Murmur  The patient reportedly had a heart murmur in the ED one month ago when being evaluated for sickness after travelling. She is concerned if this is something that can come and go.    Interval history: Not discussed today.    #5  numbness in feet  Patient reports some numbness in her feet.  Pending neurology appointment    Review of Systems:   Pertinent items are noted in HPI, remainder of complete ROS is negative.         Active Medications:     Current Outpatient Medications:      alpha-lipoic acid 600 MG capsule, Take 1 capsule (600 mg) by mouth daily, Disp: 30 capsule, Rfl: 1     atorvastatin (LIPITOR) 20 MG tablet, Take 20 mg by mouth daily, Disp: , Rfl:      calcium carbonate (OS-MEGHAN 500 MG [place]. [place]) 500 MG tablet, Take 1,000 mg by mouth 2 times daily, Disp: , Rfl:      denosumab (PROLIA) 60 MG/ML SOSY injection, Inject 60 mg Subcutaneous once, Disp: , Rfl:      Famciclovir (FAMVIR PO), Take 500 mg by mouth As needed, Disp: , Rfl:      MAGNESIUM OXIDE PO, Take 250 mg by mouth, Disp: , Rfl:      Omega-3 Fatty Acids (FISH OIL PO), , Disp: , Rfl:      POTASSIUM CITRATE PO, , Disp: , Rfl:      spironolactone (ALDACTONE) 50 MG tablet, Take 50 mg by mouth, Disp: , Rfl:      VITAMIN D, CHOLECALCIFEROL, PO, Take 2,000 Units by mouth daily, Disp: , Rfl:       Allergies:   Tramadol; Reclast [zoledronic acid]; Aspirin; Codeine; and Rofecoxib      Past Medical History:  Osteoporosis  Dysphagia     Past Surgical History:  The patient has no known surgical history.    Family History:   No family history on file.      Social History:   Social History     Tobacco Use     Smoking status: Never Smoker     Smokeless tobacco: Never Used   Substance Use Topics     Alcohol use: Not on file     Drug use: Not on file        Physical Exam:   GENERAL: Healthy,  "alert and no distress\",\"EYES: Eyes grossly normal to inspection.  No discharge or erythema, or obvious scleral/conjunctival abnormalities.\",\"RESP: No audible wheeze, cough, or visible cyanosis.  No visible retractions or increased work of breathing.  \",\"SKIN: Visible skin clear. No significant rash, abnormal pigmentation or lesions.\",\"NEURO: Cranial nerves grossly intact.  Mentation and speech appropriate for age.\",\"PSYCH: Mentation appears normal, affect normal/bright, judgement and insight intact, normal speech and appearance well-groomed.    Data:   August 2020 that showed an increase in bone density of about 6.8% of the lumbar spine and 14.8% at the level of the hip compared with 2017.  Lowest T score was -3.0 at the lumbar spine.  The right hip had a T score of -2.5.    Assessment and Plan:  #1 osteoporosis s/p 2 years of Forteo ending in Sept 2018 and start Prolia in October 2018. Pt is intolerant of Reclast.   Patient needs to get her Prolia shot every 6 months.  She might be changing providers due to her current provider retiring-she will let me know who her new provider is so I can inform them accordingly.  Would recommend repeat DEXA scan in 2022 prior to return appointment.    ADDENDUM: Pt reports that she did receive her  Prolia shot on September 20, 2021      #2 hypertension:   Improved per patient report-I think spironolactone would be a good option    #3 Musculoskeletal concerns  Improved per patient report-patient to continue on alpha lipoic acid at 600 mg daily    #4 Heart Murmur  Not discussed today    #5  numbness in feet  Highly encouraged neurology appointment.  Could consider B12 check.    Plan for return visit in 1 year with repeat DEXA-patient to have Prolia locally every 6 months in the interim.    This was a video visit conducted by Kevin: Start time 1019, stop time 1038, documentation time, coordination of care, 10 minutes.  Total time spent day of the encounter 30 minutes.    "

## 2021-10-15 ENCOUNTER — VIRTUAL VISIT (OUTPATIENT)
Dept: ENDOCRINOLOGY | Facility: CLINIC | Age: 66
End: 2021-10-15
Payer: COMMERCIAL

## 2021-10-15 DIAGNOSIS — M81.0 OSTEOPOROSIS WITHOUT CURRENT PATHOLOGICAL FRACTURE, UNSPECIFIED OSTEOPOROSIS TYPE: Primary | ICD-10-CM

## 2021-10-15 PROCEDURE — 99214 OFFICE O/P EST MOD 30 MIN: CPT | Mod: 95 | Performed by: INTERNAL MEDICINE

## 2021-10-15 NOTE — PATIENT INSTRUCTIONS
Check B12 levels for burning    Make sure you get your Prolia every 6 months    Dexa locally after 10/14/22    Let me know your new provider

## 2021-10-15 NOTE — LETTER
10/15/2021       RE: Nathalia Syed  [address] [address] [facility] [address]     Dear Colleague,    Thank you for referring your patient, Nathalia Syed, to the [facility] [place] at [facility]. Please see a copy of my visit note below.      Video-Visit Details    Type of service:  Video Visit  [facility]  Endocrinology  Kavitha Casey MD  10/15/21  Chief Complaint:   Pt presents for follow up of osteoproosis    This was a video visit conducted by Kevin: Start time 1019, stop time 1038, documentation time, coordination of care, 10 minutes.  Total time spent day of the encounter 30 minutes.    History of Present Illness:   Nathalia Syed is a 66 year old female with a history of osteoporosis who presents for evaluation of osteoporosis. Summaries per previous note.    #1 osteoporosis s/p 2 years of Forteo ending in Sept 2018 and start Prolia in October 2018. Pt is intolerant of Reclast.   Patient was initially seen in 2015 and noted to have osteoporosis. She underwent menopause in 2007 and has not been on hormone replacement therapy. Patient started Forteo 2016. She was scheduled for her first dose of Prolia on 10/5/18 as she had already taken 2 years of Forteo and doesn't tolerate Reclast. She had a DEXA performed on 10/18/19 which showed osteoporosis and a 7.5% increase in bone density of her spine compared to 2018 DEXA.  August 2020 that showed an increase in bone density of about 6.8% of the lumbar spine and 14.8% at the level of the hip compared with 2017.  Lowest T score was -2.5 at the right hip.     Interval History  Pt understands the importance of Prolia every 6 months at her local clinic.  Denies any interim fractures.    #2 hypertension:   In 2017 patient noted increasing blood pressures. She was told to follow up with her primary care provider.    Interval History  Is now on spironolactone to treat her hypertension as  well as her hair loss.  Per patient report, this has significantly improved.    #3 Musculoskeletal concerns  At her visit last year, she reports significant bilateral upper arm and shoulder weakness with associated pain.  She has been by sports medicine and underwent physical therapy.  After about 7 months of physical therapy, the symptoms have improved.    Interval history: Patient is currently on alpha lipoic acid at 600 mg daily and feels that this has been helpful for her    #4 Heart Murmur  The patient reportedly had a heart murmur in the ED one month ago when being evaluated for sickness after travelling. She is concerned if this is something that can come and go.    Interval history: Not discussed today.    #5  numbness in feet  Patient reports some numbness in her feet.  Pending neurology appointment    Review of Systems:   Pertinent items are noted in HPI, remainder of complete ROS is negative.         Active Medications:     Current Outpatient Medications:      alpha-lipoic acid 600 MG capsule, Take 1 capsule (600 mg) by mouth daily, Disp: 30 capsule, Rfl: 1     atorvastatin (LIPITOR) 20 MG tablet, Take 20 mg by mouth daily, Disp: , Rfl:      calcium carbonate (OS-MEGHAN 500 MG [place]. [place]) 500 MG tablet, Take 1,000 mg by mouth 2 times daily, Disp: , Rfl:      denosumab (PROLIA) 60 MG/ML SOSY injection, Inject 60 mg Subcutaneous once, Disp: , Rfl:      Famciclovir (FAMVIR PO), Take 500 mg by mouth As needed, Disp: , Rfl:      MAGNESIUM OXIDE PO, Take 250 mg by mouth, Disp: , Rfl:      Omega-3 Fatty Acids (FISH OIL PO), , Disp: , Rfl:      POTASSIUM CITRATE PO, , Disp: , Rfl:      spironolactone (ALDACTONE) 50 MG tablet, Take 50 mg by mouth, Disp: , Rfl:      VITAMIN D, CHOLECALCIFEROL, PO, Take 2,000 Units by mouth daily, Disp: , Rfl:       Allergies:   Tramadol; Reclast [zoledronic acid]; Aspirin; Codeine; and Rofecoxib      Past Medical History:  Osteoporosis  Dysphagia     Past Surgical History:  The patient  "has no known surgical history.    Family History:   No family history on file.      Social History:   Social History     Tobacco Use     Smoking status: Never Smoker     Smokeless tobacco: Never Used   Substance Use Topics     Alcohol use: Not on file     Drug use: Not on file        Physical Exam:   GENERAL: Healthy, alert and no distress\",\"EYES: Eyes grossly normal to inspection.  No discharge or erythema, or obvious scleral/conjunctival abnormalities.\",\"RESP: No audible wheeze, cough, or visible cyanosis.  No visible retractions or increased work of breathing.  \",\"SKIN: Visible skin clear. No significant rash, abnormal pigmentation or lesions.\",\"NEURO: Cranial nerves grossly intact.  Mentation and speech appropriate for age.\",\"PSYCH: Mentation appears normal, affect normal/bright, judgement and insight intact, normal speech and appearance well-groomed.    Data:   August 2020 that showed an increase in bone density of about 6.8% of the lumbar spine and 14.8% at the level of the hip compared with 2017.  Lowest T score was -3.0 at the lumbar spine.  The right hip had a T score of -2.5.    Assessment and Plan:  #1 osteoporosis s/p 2 years of Forteo ending in Sept 2018 and start Prolia in October 2018. Pt is intolerant of Reclast.   Patient needs to get her Prolia shot every 6 months.  She might be changing providers due to her current provider retiring-she will let me know who her new provider is so I can inform them accordingly.  Would recommend repeat DEXA scan in 2022 prior to return appointment.    ADDENDUM: Pt reports that she did receive her  Prolia shot on September 20, 2021      #2 hypertension:   Improved per patient report-I think spironolactone would be a good option    #3 Musculoskeletal concerns  Improved per patient report-patient to continue on alpha lipoic acid at 600 mg daily    #4 Heart Murmur  Not discussed today    #5  numbness in feet  Highly encouraged neurology appointment.  Could consider B12 " check.    Plan for return visit in 1 year with repeat DEXA-patient to have Prolia locally every 6 months in the interim.    This was a video visit conducted by Kevin: Start time 1019, stop time 1038, documentation time, coordination of care, 10 minutes.  Total time spent day of the encounter 30 minutes.      Loli is a 66 year old who is being evaluated via a billable video visit.      How would you like to obtain your AVS? MyChart  If the video visit is dropped, the invitation should be resent by: Text to cell phone: [phone]  Will anyone else be joining your video visit? No   Sanam Summers, EMT    Again, thank you for allowing me to participate in the care of your patient.      Sincerely,    Kavitha Casey MD

## 2021-10-15 NOTE — PROGRESS NOTES
Loli is a 66 year old who is being evaluated via a billable video visit.      How would you like to obtain your AVS? MyChart  If the video visit is dropped, the invitation should be resent by: Text to cell phone: [phone]  Will anyone else be joining your video visit? Heather Summers, EMT

## 2021-10-20 ENCOUNTER — TELEPHONE (OUTPATIENT)
Dept: ENDOCRINOLOGY | Facility: CLINIC | Age: 66
End: 2021-10-20

## 2021-10-20 NOTE — TELEPHONE ENCOUNTER
Dr Nicholson, [facility]  Notes faxed.   Dora Briones RN on 10/20/2021 at 9:32 AM       RE    ----- Message from Dora Briones RN sent at 10/20/2021  7:44 AM CDT -----    ----- Message -----  From: Kavitha Casey MD  Sent: 10/19/2021  12:57 PM CDT  To: [facility]    Please send my most recent note to her primary provider Dr. Nicholson    [phone] (fax)    [phone] (phone to confirm)

## 2021-10-20 NOTE — LETTER
"[facility] END[facility]IN[facility] [address] [address] [phone]  Clinic   Fax   FACSIMILE TRANSMITTAL sent     TO:  Dr Nicholson  COMPANY: [facility]  FAX NUMBER: [phone]  PHONE NUMBER:      FROM:   PHONE:   DATE: 10/20/21  NUMBER OF PAGES:     _____URGENT _____REVIEW ONLY _____PLEASE COMMENT____PLEASE REPLY    NOTES/COMMENTS: Chart notes for last clinic visit. Please contact clinic with questions.   RE: Nathalia Syed \"Loli\"  Female, 66 year old, 1955  MRN: [id number]  Phone: [phone]          IF YOU DID NOT RECEIVE THE CORRECT NUMBER OF PAGES OR THE FAX DID NOT COME THROUGH CLEARLY, PLEASE CALL THE SENDER     CONFIDENTIALITY STATEMENT: Confidential information that may accompany this transmission contains protected health information under state and federal law and is legally privileged. This information is intended only for the use of the individual or entity named above and may be used only for carrying out treatment, payment or other healthcare operations. The recipient or person responsible for delivering this information is prohibited by law from disclosing this information without proper authorization to any other party, unless required to do so by law or regulation. If you are not the intended recipient, you are hereby notified that any review, dissemination, distribution, or copying of this message is strictly prohibited. If you have received this communication in error, please destroy the materials and contact us immediately by calling the number listed above. No response indicates that the information was received by the appropriate authorized party   "

## 2021-11-06 ENCOUNTER — HEALTH MAINTENANCE LETTER (OUTPATIENT)
Age: 66
End: 2021-11-06

## 2021-11-23 ENCOUNTER — TELEPHONE (OUTPATIENT)
Dept: ENDOCRINOLOGY | Facility: CLINIC | Age: 66
End: 2021-11-23

## 2021-11-23 NOTE — TELEPHONE ENCOUNTER
Provider notified.   Dora Briones, RN on 11/23/2021 at 2:02 PM     Alondra Roach, RN  to Loli Syed          11/22/21 10:19 AM  Loli .  Dr Casey is wondering if you were able to get your Prolia injection . If so when and how did it go ? Alondra CASTRO    This [employer] message has not been read.      RE    Phone Message     May a detailed message be left on voicemail: yes      Reason for Call: Other: patient called and spoke with writer, she states she had her Prolia injection in August. Please call patient with further questions.      Action Taken: Message routed to:  [facility] Endo      Travel Screening: Not Applicable

## 2021-11-23 NOTE — TELEPHONE ENCOUNTER
[facility] Call Center    Phone Message    May a detailed message be left on voicemail: yes     Reason for Call: Other: patient called and spoke with writer, she states she had her Prolia injection in August. Please call patient with further questions.     Action Taken: Message routed to:  [facility] Endo     Travel Screening: Not Applicable

## 2022-01-08 ENCOUNTER — PATIENT OUTREACH (OUTPATIENT)
Dept: ENDOCRINOLOGY | Facility: CLINIC | Age: 67
End: 2022-01-08
Payer: COMMERCIAL

## 2022-01-08 NOTE — PROGRESS NOTES
Patient to see Dr. Casey virtually 11/2022.  No video appointments available. IB sent to CC to reach out to the patient.    Patient is scheduled for DEXA 10/14/22 in [place].    PCP updated to Dr. Sekou Cortés.

## 2022-04-26 ENCOUNTER — TELEPHONE (OUTPATIENT)
Dept: ENDOCRINOLOGY | Facility: CLINIC | Age: 67
End: 2022-04-26
Payer: COMMERCIAL

## 2022-04-26 NOTE — TELEPHONE ENCOUNTER
Spoke w/ Pt: states she will check with her local clinic to schedule Prolia infusion.   Dora Briones RN on 4/26/2022 at 1:09 PM

## 2022-05-13 ENCOUNTER — TELEPHONE (OUTPATIENT)
Dept: ENDOCRINOLOGY | Facility: CLINIC | Age: 67
End: 2022-05-13
Payer: COMMERCIAL

## 2022-05-13 NOTE — LETTER
Patient:  Nathalia Syed  :   1955  MRN:     [id number]    Ms.Kathy Annie Syed  [address] [address] [place] [address]    Dear Mr. Delong  This letter is on behalf of Nathalia.  I am one of the endocrinologist working with her. She is now doing better but just needs to avoid risk for falls. She has been very compliant with the treatment program. She has the following issues:     #1 osteoporosis with fracture  This patient has osteoporosis with subsequent fracture.  She suffered multiple fractures and therefore is at high risk for future fractures even though she is complying with treatment.  I would recommend that accommodations be made to reduce her risk for fractures    #2 limitations created by health condition  I would recommend that the patient cannot lift more than 10 pounds as well as precautions  to reduce fall risks.    #3 accommodations adjusting to the work environment or schedule  I would recommend that the patient not be placed at risk for falls. 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 I anticipate that this would only take about 3-5% of her effort as she is able to function within the classroom.  She is otherwise functional and able to work and this very minor assistance (if needed) should not interfere with her ability to perform her job. I will be reassessing her yearly (she has an upcoming appointment).    If you have any questions, please feel free to contact my nurse at [phone] select option #3 for triage nurse.  Regards      Kavitha Casey MD, MS

## 2022-05-13 NOTE — TELEPHONE ENCOUNTER
Spoke w/ Pt: Had prolia shot 05/06/2022.   Needs another letter for parking pass. Pt informed to reach out to [facility] for parking pass letter. Pt asks that Dr Casey complete the letter.   Provider notified.   Dora Briones RN on 5/13/2022 at 11:41 AM

## 2022-05-13 NOTE — TELEPHONE ENCOUNTER
[facility] Call Center    Phone Message    May a detailed message be left on voicemail: yes     Reason for Call: Other: Paitent only wanted to state the have a question about thier medication  no further info.  was given when asked. Please follow up . Thank you.     Action Taken: Other: ENDO    Travel Screening: Not Applicable

## 2022-07-07 ENCOUNTER — TELEPHONE (OUTPATIENT)
Dept: ENDOCRINOLOGY | Facility: CLINIC | Age: 67
End: 2022-07-07

## 2022-07-07 NOTE — TELEPHONE ENCOUNTER
----- Message from Kavitha Casey MD sent at 10/15/2021  5:59 PM CDT -----  Patient have return visit with DEXA prior to return visit (done locally) in October 2022 ?

## 2022-07-07 NOTE — TELEPHONE ENCOUNTER
[facility] 7.7.22 for pt to schedule DEXA per Dr. Casey's orders. Dr. Casey would like this scan to be completed prior to appt on 10/2022.

## 2022-07-12 NOTE — TELEPHONE ENCOUNTER
Spoke w/ Pt; Confirms understanding that DEXA scan needs to be on same machine as previously completed.   Provider notified to advise.   Dora Briones RN on 7/12/2022 at 1:47 PM         RE    Appointment (Scheduling DEXA per Dr. Casey )         Per Patient is returning a call. Patient states she has a Dexa Scan appt scheduled closer to her home on 10/14/2022 at [facility] from [facility] and is wondering if that would be okay for the appt with Dr. Casey on 10/28/2022. Patient would like a call back to further discuss. Please advise.

## 2022-07-12 NOTE — TELEPHONE ENCOUNTER
Per Patient is returning a call. Patient states she has a Dexa Scan appt scheduled closer to her home on 10/14/2022 at [facility] from [facility] and is wondering if that would be okay for the appt with Dr. Casey on 10/28/2022. Patient would like a call back to further discuss. Please advise.

## 2022-08-24 ENCOUNTER — TRANSCRIBE ORDERS (OUTPATIENT)
Dept: OTHER | Age: 67
End: 2022-08-24

## 2022-08-24 DIAGNOSIS — M81.0 OSTEOPOROSIS WITHOUT CURRENT PATHOLOGICAL FRACTURE, UNSPECIFIED OSTEOPOROSIS TYPE: Primary | ICD-10-CM

## 2022-10-28 ENCOUNTER — TELEPHONE (OUTPATIENT)
Dept: ENDOCRINOLOGY | Facility: CLINIC | Age: 67
End: 2022-10-28

## 2022-10-30 ENCOUNTER — HEALTH MAINTENANCE LETTER (OUTPATIENT)
Age: 67
End: 2022-10-30

## 2022-10-31 ENCOUNTER — TELEPHONE (OUTPATIENT)
Dept: ENDOCRINOLOGY | Facility: CLINIC | Age: 67
End: 2022-10-31

## 2022-10-31 NOTE — LETTER
Patient:  Nathalia Syed  :   1955  MRN:     [id number]        Ms.Kathy CM Syed  [address] [address] [facility] [place] [address]        November 15, 2022    Dear Loli    It was great to talk to you.  I reviewed your bone density.  You have actually gained bone at the level of the right total hip so that you are now osteopenic, instead of osteoporotic, at the right total hip.  This is great news.  Please continue with the Prolia every 6 months, this can be done by your local provider.  If you happen to break a bone in the interim before your follow-up visit with me in summer 2024, please let me know and we can consider something stronger.  However if you continue to do well, I think the Prolia every 6 months would be reasonable.  My team will reach out to you about a follow-up visit in summer 2024.  If you do not hear from them, please let me know.    If you have any questions, please feel free to contact my nurse at [phone] select option #3 for triage nurse  or  option #1 for scheduling related questions.    Regards    Kavitha Casey MD

## 2022-10-31 NOTE — TELEPHONE ENCOUNTER
[facility] Call Center    Phone Message    May a detailed message be left on voicemail: yes     Reason for Call: Other: Patient called to advise Dr Casey that patient had her Dexa Scan done on 10/14/22 at [facility] in [place]. Patient is scheduled for her next follow up visit on 3/3/23.     Action Taken: Message routed to:  [facility] End[facility]inology    Travel Screening: Not Applicable

## 2022-11-15 NOTE — TELEPHONE ENCOUNTER
Called pt - BMD stable at the back and increased at the hip - pt to return in summer 2024    -  Name: ANITA FRAIRE   : 1955       RADIOLOGY EXAM   DATE AND TIME OF SERVICE: 10/14/2022 00:00:00   INDICATION: ICD-10 M81.0 Age-related osteoporosis without current   pathological fracture   osteoporosis     EXAM: DEXA SCAN       BONE MINERAL DENSITY STUDY-[facility]     RISK FACTORS FOR OSTEOPOROSIS:  A fractured left hip.  Note, reports left   hip surgery due to a break.       Note left hip is replaced.     RESULTS:   LUMBAR SPINE, L1-L4:  BMD is 0.822 g/cm2.  This is 70% of the young adult   predicted peak and 83% of an aged-matched population.  This provides a   T-score of -3.0.     RIGHT HIP, based on the lowest T-score, the right femoral neck BMD is   0.724 g/cm2.  This is 70% of the young adult predicted peak and 87% of the   aged-matched population.  This provides a T-score of -2.3.     CONCLUSION:   1.  Osteoporosis with lowest T-score of -3.0 in the lumbar spine.   2.  Since the most recent scan 10/13/2020, BMD has remained essentially   unchanged in the lumbar spine, but increased 3.4% in the right total hip.         RECOMMENDATIONS:   1.  Consume calcium and vitamin D according to age specific   recommendations.   2.  Some patients with osteopenia as well as those with osteoporosis   should be considered for additional medical treatment.   3.  Consider evaluation of any patient with low BMD for secondary causes   of osteoporosis.   4.  Followup DEXA scan in 2 years.     APPENDIX:   T and Z-SCORES:   WHO classification:  T-score -1.0 or greater is normal, -1.1 to -2.4 is   osteopenia, -2.5 or lower is osteoporosis.   a.  T-score of 1.0 equals 1 standard deviation.   b.  Use for postmenopausal women.   c.  Do not apply to healthy premenopausal women (use Z-scores not   T-scores).   d.  Use for men age 50 and older.   e.  Fracture risk doubles for each 1.0 decrease in T-score.

## 2022-11-16 ENCOUNTER — TELEPHONE (OUTPATIENT)
Dept: ENDOCRINOLOGY | Facility: CLINIC | Age: 67
End: 2022-11-16

## 2022-11-16 NOTE — TELEPHONE ENCOUNTER
Called pt 's primary provider - Dr. Cortés'sRN - will fax our information accordingly [phone] (fax) - will call [phone]     Called radiology at [phone] to note about editing report

## 2022-11-17 ENCOUNTER — TELEPHONE (OUTPATIENT)
Dept: ENDOCRINOLOGY | Facility: CLINIC | Age: 67
End: 2022-11-17

## 2022-12-17 ENCOUNTER — HEALTH MAINTENANCE LETTER (OUTPATIENT)
Age: 67
End: 2022-12-17

## 2023-01-13 ENCOUNTER — TELEPHONE (OUTPATIENT)
Dept: ENDOCRINOLOGY | Facility: CLINIC | Age: 68
End: 2023-01-13

## 2023-01-13 NOTE — LETTER
Patient:  Nathalia Syed  :   1955  MRN:     [id number]        Ms.Kathy CM Syed  [address] [address]        2023    Dear ,    I see that you do not have a follow up appointment. I would recommend that you be evaluated by an endocrinologist to minimize complications. Of note, I am scheduling out about 6 months.  If you like to be seen at the [facility], please call [phone] select option #1 for an appointment.    Regards    Kavitha Casey MD

## 2023-09-29 ENCOUNTER — VIRTUAL VISIT (OUTPATIENT)
Dept: ENDOCRINOLOGY | Facility: CLINIC | Age: 68
End: 2023-09-29
Payer: COMMERCIAL

## 2023-09-29 ENCOUNTER — TELEPHONE (OUTPATIENT)
Dept: ENDOCRINOLOGY | Facility: CLINIC | Age: 68
End: 2023-09-29

## 2023-09-29 DIAGNOSIS — M81.0 OSTEOPOROSIS WITHOUT CURRENT PATHOLOGICAL FRACTURE, UNSPECIFIED OSTEOPOROSIS TYPE: Primary | ICD-10-CM

## 2023-09-29 PROCEDURE — 99213 OFFICE O/P EST LOW 20 MIN: CPT | Mod: VID | Performed by: INTERNAL MEDICINE

## 2023-09-29 NOTE — PROGRESS NOTES
Video-Visit Details    Type of service:  Video Visit    Virtual visit conducted by Kevin.      Originating Location (pt. Location): HOME    Distant Location (provider location):  Off site    Mode of Communication:  Video Conference via [employer]    Physician has received verbal consent for a Video Visit from the patient? YES      Kavitha Casey MD    Start time 1035, stop time 1045, chart review, documentation time, coordination of care, 10 minutes.  Total time spent day of the encounter 20 minutes.  9/29/23    [facility]  Endocrinology  Kavitha Casey MD    Chief Complaint:   Pt presents for follow up of osteoproosis      History of Present Illness:   #1 osteoporosis s/p 2 years of Forteo ending in Sept 2018 and start Prolia in October 2018. Pt is intolerant of Reclast.   Patient was initially seen in 2015 and noted to have osteoporosis. She underwent menopause in 2007 and has not been on hormone replacement therapy. Patient started Forteo 2016. She was scheduled for her first dose of Prolia on 10/5/18 as she had already taken 2 years of Forteo and doesn't tolerate Reclast. She had a DEXA performed on 10/18/19 which showed osteoporosis and a 7.5% increase in bone density of her spine compared to 2018 DEXA.  August 2020 that showed an increase in bone density of about 6.8% of the lumbar spine and 14.8% at the level of the hip compared with 2017.  Lowest T score was -2.5 at the right hip.     Interval History  Pt understands the importance of Prolia every 6 months at her local clinic.  October 2022 bone density showed general stability at the level of the back, and an increase in the bone density by 3.4% at the right total hip.  October 2022 TSH 1.12.  In September 2023, she had a left first toe fracture after she bumped her foot into a cabinet.  She continues to report difficulty with ambulation due to her history of hip fracture full-grown she is participating in physical therapy.  However she reports general  "gait instability and difficulty walking.    #2 hypertension:   In 2017 patient noted increasing blood pressures. She was told to follow up with her primary care provider.    Interval History  Currently on spironolactone.  Followed by primary provider.  Per patient report, blood pressure significantly improved.    #3 Musculoskeletal concerns  At her visit last year, she reports significant bilateral upper arm and shoulder weakness with associated pain.  She has been by sports medicine and underwent physical therapy.      Interval history: The patient still reports significant gait instability due to her prior history of hip fracture, and feeling \"unsteady.    #4 Heart Murmur  The patient reportedly had a heart murmur in the ED one month ago when being evaluated for sickness after travelling. She is concerned if this is something that can come and go.    Interval history: Not discussed today.    #5  numbness in feet  Patient reports some numbness in her feet.      Interval history: Per patient report, this was evaluated and observation was recommended.    Review of Systems:   Pertinent items are noted in HPI, remainder of complete ROS is negative.         Active Medications:     Current Outpatient Medications:      alpha-lipoic acid 600 MG capsule, Take 1 capsule (600 mg) by mouth daily, Disp: 30 capsule, Rfl: 1     atorvastatin (LIPITOR) 20 MG tablet, Take 20 mg by mouth daily, Disp: , Rfl:      calcium carbonate (OS-MEGHAN 500 MG [place]. [place]) 500 MG tablet, Take 1,000 mg by mouth 2 times daily, Disp: , Rfl:      denosumab (PROLIA) 60 MG/ML SOSY injection, Inject 60 mg Subcutaneous once, Disp: , Rfl:      Famciclovir (FAMVIR PO), Take 500 mg by mouth As needed, Disp: , Rfl:      MAGNESIUM OXIDE PO, Take 250 mg by mouth, Disp: , Rfl:      Omega-3 Fatty Acids (FISH OIL PO), , Disp: , Rfl:      POTASSIUM CITRATE PO, , Disp: , Rfl:      spironolactone (ALDACTONE) 50 MG tablet, Take 50 mg by mouth, Disp: , Rfl:      VITAMIN D, " "CHOLECALCIFEROL, PO, Take 2,000 Units by mouth daily, Disp: , Rfl:       Allergies:   Tramadol; Reclast [zoledronic acid]; Aspirin; Codeine; and Rofecoxib      Past Medical History:  Osteoporosis  Dysphagia     Past Surgical History:  The patient has no known surgical history.    Family History:   No family history on file.      Social History:   Social History     Tobacco Use     Smoking status: Never     Smokeless tobacco: Never        Physical Exam:   GENERAL: Healthy, alert and no distress\",\"EYES: Eyes grossly normal to inspection.  No discharge or erythema, or obvious scleral/conjunctival abnormalities.\",\"RESP: No audible wheeze, cough, or visible cyanosis.  No visible retractions or increased work of breathing.  \",\"SKIN: Visible skin clear. No significant rash, abnormal pigmentation or lesions.\",\"NEURO: Cranial nerves grossly intact.  Mentation and speech appropriate for age.\",\"PSYCH: Mentation appears normal, affect normal/bright, judgement and insight intact, normal speech and appearance well-groomed.    Data:   August 2020 that showed an increase in bone density of about 6.8% of the lumbar spine and 14.8% at the level of the hip compared with 2017.  Lowest T score was -3.0 at the lumbar spine.  The right hip had a T score of -2.5.    Assessment and Plan:  #1 osteoporosis s/p 2 years of Forteo ending in Sept 2018 and start Prolia in October 2018. Pt is intolerant of Reclast.   Although she did have a right first toe fracture noted in September 2023, this is not an osteoporosis related fracture.  I discussed with the patient the possibility of Evenity, she will think about this.  Pending repeat bone density in October 2023.  I think if we have evidence of ongoing bone loss or additional fractures, we could consider Evenity.  However if her bone density is stable and she does not have future significant fractures, I think the Prolia would be just fine.  I do not think that the right first toe fracture " necessarily constitute a failure of the Prolia treatment.    #2 hypertension:   Better on spironolactone.    #3 Musculoskeletal concerns  Recommended physical therapy.  Disability letter written for patient for accommodation at school.    #4 Heart Murmur  Defer to primary provider    #5  numbness in feet  Defer to primary provider    Patient pending repeat DEXA scan in October 2023.  I will follow-up on these results and contact her primary provider about the neck step pending these results.  Return visit in 1 year.

## 2023-09-29 NOTE — LETTER
Patient:  Nathalia Syed  :   1955  MRN:     [id number]        Ms.Kathy CM Syed  [address] [address]        2023    Dear Mr. Delong    This letter is on behalf of Nathalia.  I am one of the endocrinologist working with her. She is now doing better but just needs to avoid risk for falls. She has been very compliant with the treatment program. She has the following issues:      #1 osteoporosis with fracture  This patient has osteoporosis with subsequent fracture.  She suffered multiple fractures and therefore is at high risk for future fractures even though she is complying with treatment.  I would recommend that accommodations be made to reduce her risk for fractures     #2 limitations created by health condition  I would recommend that the patient cannot lift more than 10 pounds as well as precautions  to reduce fall risks.     #3 accommodations adjusting to the work environment or schedule  I would recommend that the patient not be placed at risk for falls. 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 I anticipate that this would only take about 3-5% of her effort as she is able to function within the classroom.  She is otherwise functional and able to work and this very minor assistance (if needed) should not interfere with her ability to perform her job. I will be reassessing her yearly (she has an upcoming appointment).     If you have any questions, please feel free to contact my nurse at [phone] select option #3 for triage nurse.  Regards        Kavitha Casey MD, MS

## 2023-09-29 NOTE — NURSING NOTE
Is the patient currently in the state of [place]? YES    Visit mode:VIDEO    If the visit is dropped, the patient can be reconnected by: VIDEO VISIT: Text to cell phone:   Telephone Information:   Mobile [phone]    and VIDEO VISIT: Send to e-mail at: [email or web address]    Will anyone else be joining the visit? NO  (If patient encounters technical issues they should call [phone]    How would you like to obtain your AVS? MyChart    Are changes needed to the allergy or medication list? No    Reason for visit: MECHELLE CHANG

## 2023-09-29 NOTE — LETTER
9/29/2023       RE: Nathalia Syed  [address] [facility] [address]     Dear Colleague,    Thank you for referring your patient, Nathalia Syed, to the [facility] ENDOCRINOLOGY CLINIC [place] [facility]. Please see a copy of my visit note below.    Video-Visit Details    Type of service:  Video Visit    Virtual visit conducted by Kevin.      Originating Location (pt. Location): HOME    Distant Location (provider location):  Off site    Mode of Communication:  Video Conference via [employer]    Physician has received verbal consent for a Video Visit from the patient? YES      Kavitha Casey MD    Start time 1035, stop time 1045, chart review, documentation time, coordination of care, 10 minutes.  Total time spent day of the encounter 20 minutes.  9/29/23    [facility]  Endocrinology  Kavitha Casey MD    Chief Complaint:   Pt presents for follow up of osteoproosis      History of Present Illness:   #1 osteoporosis s/p 2 years of Forteo ending in Sept 2018 and start Prolia in October 2018. Pt is intolerant of Reclast.   Patient was initially seen in 2015 and noted to have osteoporosis. She underwent menopause in 2007 and has not been on hormone replacement therapy. Patient started Forteo 2016. She was scheduled for her first dose of Prolia on 10/5/18 as she had already taken 2 years of Forteo and doesn't tolerate Reclast. She had a DEXA performed on 10/18/19 which showed osteoporosis and a 7.5% increase in bone density of her spine compared to 2018 DEXA.  August 2020 that showed an increase in bone density of about 6.8% of the lumbar spine and 14.8% at the level of the hip compared with 2017.  Lowest T score was -2.5 at the right hip.     Interval History  Pt understands the importance of Prolia every 6 months at her local clinic.  October 2022 bone density showed general stability at the level of the back, and an increase in the bone density by  "3.4% at the right total hip.  October 2022 TSH 1.12.  In September 2023, she had a left first toe fracture after she bumped her foot into a cabinet.  She continues to report difficulty with ambulation due to her history of hip fracture full-grown she is participating in physical therapy.  However she reports general gait instability and difficulty walking.    #2 hypertension:   In 2017 patient noted increasing blood pressures. She was told to follow up with her primary care provider.    Interval History  Currently on spironolactone.  Followed by primary provider.  Per patient report, blood pressure significantly improved.    #3 Musculoskeletal concerns  At her visit last year, she reports significant bilateral upper arm and shoulder weakness with associated pain.  She has been by sports medicine and underwent physical therapy.      Interval history: The patient still reports significant gait instability due to her prior history of hip fracture, and feeling \"unsteady.    #4 Heart Murmur  The patient reportedly had a heart murmur in the ED one month ago when being evaluated for sickness after travelling. She is concerned if this is something that can come and go.    Interval history: Not discussed today.    #5  numbness in feet  Patient reports some numbness in her feet.      Interval history: Per patient report, this was evaluated and observation was recommended.    Review of Systems:   Pertinent items are noted in HPI, remainder of complete ROS is negative.         Active Medications:     Current Outpatient Medications:     alpha-lipoic acid 600 MG capsule, Take 1 capsule (600 mg) by mouth daily, Disp: 30 capsule, Rfl: 1    atorvastatin (LIPITOR) 20 MG tablet, Take 20 mg by mouth daily, Disp: , Rfl:     calcium carbonate (OS-MEGHAN 500 MG [place]. [place]) 500 MG tablet, Take 1,000 mg by mouth 2 times daily, Disp: , Rfl:     denosumab (PROLIA) 60 MG/ML SOSY injection, Inject 60 mg Subcutaneous once, Disp: , Rfl:     " "Famciclovir (FAMVIR PO), Take 500 mg by mouth As needed, Disp: , Rfl:     MAGNESIUM OXIDE PO, Take 250 mg by mouth, Disp: , Rfl:     Omega-3 Fatty Acids (FISH OIL PO), , Disp: , Rfl:     POTASSIUM CITRATE PO, , Disp: , Rfl:     spironolactone (ALDACTONE) 50 MG tablet, Take 50 mg by mouth, Disp: , Rfl:     VITAMIN D, CHOLECALCIFEROL, PO, Take 2,000 Units by mouth daily, Disp: , Rfl:       Allergies:   Tramadol; Reclast [zoledronic acid]; Aspirin; Codeine; and Rofecoxib      Past Medical History:  Osteoporosis  Dysphagia     Past Surgical History:  The patient has no known surgical history.    Family History:   No family history on file.      Social History:   Social History     Tobacco Use    Smoking status: Never    Smokeless tobacco: Never        Physical Exam:   GENERAL: Healthy, alert and no distress\",\"EYES: Eyes grossly normal to inspection.  No discharge or erythema, or obvious scleral/conjunctival abnormalities.\",\"RESP: No audible wheeze, cough, or visible cyanosis.  No visible retractions or increased work of breathing.  \",\"SKIN: Visible skin clear. No significant rash, abnormal pigmentation or lesions.\",\"NEURO: Cranial nerves grossly intact.  Mentation and speech appropriate for age.\",\"PSYCH: Mentation appears normal, affect normal/bright, judgement and insight intact, normal speech and appearance well-groomed.    Data:   August 2020 that showed an increase in bone density of about 6.8% of the lumbar spine and 14.8% at the level of the hip compared with 2017.  Lowest T score was -3.0 at the lumbar spine.  The right hip had a T score of -2.5.    Assessment and Plan:  #1 osteoporosis s/p 2 years of Forteo ending in Sept 2018 and start Prolia in October 2018. Pt is intolerant of Reclast.   Although she did have a right first toe fracture noted in September 2023, this is not an osteoporosis related fracture.  I discussed with the patient the possibility of Evenity, she will think about this.  Pending repeat bone " density in October 2023.  I think if we have evidence of ongoing bone loss or additional fractures, we could consider Evenity.  However if her bone density is stable and she does not have future significant fractures, I think the Prolia would be just fine.  I do not think that the right first toe fracture necessarily constitute a failure of the Prolia treatment.    #2 hypertension:   Better on spironolactone.    #3 Musculoskeletal concerns  Recommended physical therapy.  Disability letter written for patient for accommodation at school.    #4 Heart Murmur  Defer to primary provider    #5  numbness in feet  Defer to primary provider    Patient pending repeat DEXA scan in October 2023.  I will follow-up on these results and contact her primary provider about the neck step pending these results.  Return visit in 1 year.

## 2023-10-02 ENCOUNTER — TELEPHONE (OUTPATIENT)
Dept: ENDOCRINOLOGY | Facility: CLINIC | Age: 68
End: 2023-10-02
Payer: COMMERCIAL

## 2023-10-04 ENCOUNTER — TELEPHONE (OUTPATIENT)
Dept: ENDOCRINOLOGY | Facility: CLINIC | Age: 68
End: 2023-10-04
Payer: COMMERCIAL

## 2023-10-10 ENCOUNTER — TELEPHONE (OUTPATIENT)
Dept: ENDOCRINOLOGY | Facility: CLINIC | Age: 68
End: 2023-10-10
Payer: COMMERCIAL

## 2023-10-10 NOTE — TELEPHONE ENCOUNTER
Left Voicemail (2nd Attempt) and Sent Mychart (2nd Attempt) for the patient to call back and schedule the following:    Appointment type: Return Endocrine   Provider:   Return date: September 2024  Specialty phone number: [phone]  Additional appointment(s) needed: N/A  Additonal Notes: LVM, and sent MyC

## 2023-10-16 ENCOUNTER — TRANSFERRED RECORDS (OUTPATIENT)
Dept: HEALTH INFORMATION MANAGEMENT | Facility: CLINIC | Age: 68
End: 2023-10-16

## 2023-10-21 NOTE — LETTER
Patient:  Nathalia Syed  :   1955  MRN:     [id number]        Ms.Kathy Annie Syed  [address] [facility] [place] [address]        2018    Dear Mr. Delong    This letter is on behalf of Nathalia.  I am one of the endocrinologist working with her. She is now doing better but just needs to avoid risk for falls. She has been very compliant with the treatment program. She has the following issues:     #1 osteoporosis with fracture  This patient has osteoporosis with subsequent fracture.  She suffered multiple fractures and therefore is at high risk for future fractures even though she is complying with treatment.  I would recommend that accommodations be made to reduce her risk for fractures    #2 limitations created by health condition  I would recommend that the patient cannot lift more than 10 pounds as well as precautions  to reduce fall risks.    #3 accommodations adjusting to the work environment or schedule  I would recommend that the patient not be placed at risk for falls. 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 I anticipate that this would only take about 3-5% of her effort as she is able to function within the classroom.  She is otherwise functional and able to work and this very minor assistance (if needed) should not interfere with her ability to perform her job. I will be reassessing her yearly (she has an upcoming appointment).    If you have any questions, please feel free to contact my nurse at [phone] select option #3 for triage nurse.    Regards      Kavitha Casey MD, MS     3

## 2023-11-20 ENCOUNTER — TELEPHONE (OUTPATIENT)
Dept: ENDOCRINOLOGY | Facility: CLINIC | Age: 68
End: 2023-11-20
Payer: COMMERCIAL

## 2023-11-20 NOTE — TELEPHONE ENCOUNTER
Called pt - ok to continue with prolia since difference in positiong between the two studies might affect the observed changes at the level of the hip- will ask primary provider to make sure that positioning is not an issue and that pt ok to continue with Prolia.  Called pt and left message.   -  BONE MINERAL DENSITY STUDY-[employer]     RISK FACTORS FOR OSTEOPOROSIS:  Left hip fracture, has pins in left hip.     RESULTS:   LUMBAR SPINE, L1-L4:  BMD is 0.906 g/cm2.  This is 77% of the young adult   predicted peak and 92% of an aged-matched population.  This provides a   T-score of -2.3.     RIGHT HIP, based on the lowest T-score, the right femoral neck BMD is   0.679 g/cm2.  This is 65% of the young adult predicted peak and 83% of the   aged-matched population.  This provides a T-score of -2.6.     CONCLUSION:   1.  Osteoporosis with lowest T-score -2.6 femoral neck right hip.   2.  Since the most recent scan 10/14/2022, BMD has increased 10.2% in the   lumbar spine but decreased 5.1% in the right total hip.     RECOMMENDATIONS:   1.  Consume calcium and vitamin D according to age specific   recommendations.   2.  Some patients with osteopenia as well as those with osteoporosis   should be considered for additional medical treatment.   3.  Consider evaluation of any patient with low BMD for secondary causes   of osteoporosis.   4.  Followup DEXA scan in 2 years.

## 2023-12-12 ENCOUNTER — TELEPHONE (OUTPATIENT)
Dept: ENDOCRINOLOGY | Facility: CLINIC | Age: 68
End: 2023-12-12
Payer: COMMERCIAL

## 2023-12-12 NOTE — TELEPHONE ENCOUNTER
Images personally reviewed.  Likely differences in positioning may affect observed changes in bone density.  Continue with Prolia for now.  ----- Message from Sanam Childs sent at 11/21/2023  1:43 PM CST -----  Regarding: Outside Dexa scan  New outside Dexa scan results for this patient sent to records. Will be available in Epic for you to view soon.  Sanam

## 2024-01-08 ENCOUNTER — TELEPHONE (OUTPATIENT)
Dept: ENDOCRINOLOGY | Facility: CLINIC | Age: 69
End: 2024-01-08
Payer: COMMERCIAL

## 2024-01-08 NOTE — TELEPHONE ENCOUNTER
Patient call:     Appointment type: return endocrine  Provider: DR. SEYMOUR  Return date:SCHEDULE AROUND JUNE -JULY 2024   Speciality phone number: [phone]   Additional appointment(s) needed:   Additional notes:  TALKED TO PATIENT WAS IN THE MIDDLE OF TEACHING ASKED TO GET CALL BACK ALSO SENT HER MYC (LET HER KNOW I AM SENDING) WITH THE PHONE NUMBER SHE CAN CALL BACK TO SCHEDULE  Tanna Juarez on 1/8/2024 at 9:31 AM

## 2024-01-08 NOTE — TELEPHONE ENCOUNTER
CALLED PT BACK AND SCHEDULED RETURN VISIT WITH DR. SEYMOUR SUMMER 2024   Tanna Juarez on 1/8/2024 at 11:13 AM

## 2024-01-21 ENCOUNTER — HEALTH MAINTENANCE LETTER (OUTPATIENT)
Age: 69
End: 2024-01-21

## 2024-02-07 ENCOUNTER — TELEPHONE (OUTPATIENT)
Dept: ENDOCRINOLOGY | Facility: CLINIC | Age: 69
End: 2024-02-07
Payer: COMMERCIAL

## 2024-02-14 DIAGNOSIS — M81.0 SENILE OSTEOPOROSIS: Primary | ICD-10-CM

## 2024-03-13 ENCOUNTER — TELEPHONE (OUTPATIENT)
Dept: ENDOCRINOLOGY | Facility: CLINIC | Age: 69
End: 2024-03-13
Payer: COMMERCIAL

## 2024-03-13 NOTE — TELEPHONE ENCOUNTER
----- Message from Katharine Sands sent at 3/10/2024  3:20 AM CDT -----  Regarding: PROLIA - remove  Rusty Lopez,    Please remove the infusion appointment request line from the therapy plan per the 10/19/23 Office Visit that she gets the Prolia at [facility]    Thank you,  Katharine GARZA [facility]  [facility] Scheduling   (option 3, then option 2)  [facility] / Palliative / GynOnc Scheduling   (option 5, then option 2)      ----- Message -----  From: Ozzy Loyd  Sent: 2/29/2024  11:19 AM CDT  To: Katharine Sands  Subject: RE: work queue                                   Gets it up in [place]  ----- Message -----  From: Katharine Sands  Sent: 2/28/2024   7:51 PM CST  To: Ozzy Loyd  Subject: work queue                                       PROLIA

## 2024-04-16 ENCOUNTER — TELEPHONE (OUTPATIENT)
Dept: ENDOCRINOLOGY | Facility: CLINIC | Age: 69
End: 2024-04-16
Payer: COMMERCIAL

## 2024-04-16 NOTE — TELEPHONE ENCOUNTER
Patient confirmed scheduled appointment:  Date: 7/29  Time: 9:30   Visit type: return endocrine   Provider: Jacinto   Location: virtual   Testing/imaging:   Additional notes: hard stop due to pts insurance; sent email to update so pt can be r/s     Vaughn Bass on 4/16/2024 at 4:10 PM

## 2024-07-29 ENCOUNTER — VIRTUAL VISIT (OUTPATIENT)
Dept: ENDOCRINOLOGY | Facility: CLINIC | Age: 69
End: 2024-07-29
Payer: COMMERCIAL

## 2024-07-29 ENCOUNTER — TELEPHONE (OUTPATIENT)
Dept: ENDOCRINOLOGY | Facility: CLINIC | Age: 69
End: 2024-07-29

## 2024-07-29 DIAGNOSIS — G62.9 NEUROPATHY: Primary | ICD-10-CM

## 2024-07-29 DIAGNOSIS — M81.0 SENILE OSTEOPOROSIS: ICD-10-CM

## 2024-07-29 PROCEDURE — 99214 OFFICE O/P EST MOD 30 MIN: CPT | Mod: 95 | Performed by: INTERNAL MEDICINE

## 2024-07-29 RX ORDER — DIPHENHYDRAMINE HYDROCHLORIDE 50 MG/ML
50 INJECTION INTRAMUSCULAR; INTRAVENOUS
Start: 2024-07-29

## 2024-07-29 RX ORDER — GABAPENTIN 100 MG/1
100 CAPSULE ORAL AT BEDTIME
Qty: 30 CAPSULE | Refills: 1 | Status: SHIPPED | OUTPATIENT
Start: 2024-07-29

## 2024-07-29 RX ORDER — EPINEPHRINE 1 MG/ML
0.3 INJECTION, SOLUTION, CONCENTRATE INTRAVENOUS EVERY 5 MIN PRN
OUTPATIENT
Start: 2024-07-29

## 2024-07-29 RX ORDER — ALBUTEROL SULFATE 90 UG/1
1-2 AEROSOL, METERED RESPIRATORY (INHALATION)
Start: 2024-07-29

## 2024-07-29 RX ORDER — MEPERIDINE HYDROCHLORIDE 25 MG/ML
25 INJECTION INTRAMUSCULAR; INTRAVENOUS; SUBCUTANEOUS EVERY 30 MIN PRN
OUTPATIENT
Start: 2024-07-29

## 2024-07-29 RX ORDER — METHYLPREDNISOLONE SODIUM SUCCINATE 125 MG/2ML
125 INJECTION, POWDER, LYOPHILIZED, FOR SOLUTION INTRAMUSCULAR; INTRAVENOUS
Start: 2024-07-29

## 2024-07-29 RX ORDER — ALBUTEROL SULFATE 0.83 MG/ML
2.5 SOLUTION RESPIRATORY (INHALATION)
OUTPATIENT
Start: 2024-07-29

## 2024-07-29 NOTE — LETTER
7/29/2024       RE: Nathalia Syed  [id number] [facility] [id number]     Dear Colleague,    Thank you for referring your patient, Nathalia Syed, to the [facility] ENDOCRINOLOGY [facility]IN[facility] [place] [facility]. Please see a copy of my visit note below.    Video-Visit Details    Type of service:  Video Visit    Virtual visit conducted by Kevin.      Originating Location (pt. Location): HOME    Distant Location (provider location):  Off site    Mode of Communication:  Video Conference via [email or web address]    Physician has received verbal consent for a Video Visit from the patient? YES      Kavitha Casey MD    7/29/24    [facility]  Endocrinology  Kavitha Casey MD    Chief Complaint:   Pt presents for follow up of osteoproosis    Start time 930, stop time 950, chart review, documentation time, coordination of care, 10 minutes, total time spent day of the encounter 30 minutes      History of Present Illness:   #1 osteoporosis s/p 2 years of Forteo ending in Sept 2018 and start Prolia in October 2018. Pt is intolerant of Reclast.   Patient was initially seen in 2015 and noted to have osteoporosis. She underwent menopause in 2007 and has not been on hormone replacement therapy. Patient started Forteo 2016. She was scheduled for her first dose of Prolia on 10/5/18 as she had already taken 2 years of Forteo and doesn't tolerate Reclast. She had a DEXA performed on 10/18/19 which showed osteoporosis and a 7.5% increase in bone density of her spine compared to 2018 DEXA.  August 2020 that showed an increase in bone density of about 6.8% of the lumbar spine and 14.8% at the level of the hip compared with 2017.  Lowest T score was -2.5 at the right hip.  October 2022 bone density showed general stability at the level of the back, and an increase in the bone density by 3.4% at the right total hip.  October 2022 TSH 1.12.  In September 2023, she had a left first toe  "fracture after she bumped her foot into a cabinet.      Interval History  Pt understands the importance of Prolia every 6 months at her local clinic.  She continues to report gait instability and difficulty walking.  October 2023 DEXA scan showed the lowest T-score -2.6 at the right hip, with noted increase of 10.2% at the lumbar spine but decreased at 5.1% in the right total hip.  By my review, this is thought to be due to differences in positioning.  Denies any recent fractures.    #2 hypertension:   In 2017 patient noted increasing blood pressures. She was told to follow up with her primary care provider.    Interval History  Currently on spironolactone.  Per patient report, this is generally improved.    #3 Musculoskeletal concerns, now with \"burning of her right hip  At her visit last year, she reports significant bilateral upper arm and shoulder weakness with associated pain.  She has been by sports medicine and underwent physical therapy.      Interval history: The patient still reports significant gait instability due to her prior history of hip fracture, and feeling \"unsteady.  She now reports new onset of \"burning\" of her right hip, which starts at her back and goes all the way to her foot.  This is roughly 5 out of 10 in intensity as well as a \"dull ache\" roughly 4 out of 10 in intensity.  He reports that this has been disruptive to her sleep.    #4  numbness in feet  Patient reports some numbness in her feet.      Interval history: She reports that this is a different issue than issue #3.    Review of Systems:   Pertinent items are noted in HPI, remainder of complete ROS is negative.         Active Medications:     Current Outpatient Medications:      alpha-lipoic acid 600 MG capsule, Take 1 capsule (600 mg) by mouth daily, Disp: 30 capsule, Rfl: 1     atorvastatin (LIPITOR) 20 MG tablet, Take 20 mg by mouth daily, Disp: , Rfl:      calcium carbonate (OS-MEGHAN 500 MG [place]. [place]) 500 MG tablet, Take 1,000 mg " "by mouth 2 times daily, Disp: , Rfl:      denosumab (PROLIA) 60 MG/ML SOSY injection, Inject 60 mg Subcutaneous once, Disp: , Rfl:      Famciclovir (FAMVIR PO), Take 500 mg by mouth As needed, Disp: , Rfl:      MAGNESIUM OXIDE PO, Take 250 mg by mouth, Disp: , Rfl:      Omega-3 Fatty Acids (FISH OIL PO), , Disp: , Rfl:      POTASSIUM CITRATE PO, , Disp: , Rfl:      spironolactone (ALDACTONE) 50 MG tablet, Take 50 mg by mouth, Disp: , Rfl:      VITAMIN D, CHOLECALCIFEROL, PO, Take 2,000 Units by mouth daily, Disp: , Rfl:       Allergies:   Tramadol; Reclast [zoledronic acid]; Aspirin; Codeine; and Rofecoxib      Past Medical History:  Osteoporosis  Dysphagia     Past Surgical History:  The patient has no known surgical history.    Family History:   No family history on file.      Social History:   Social History     Tobacco Use     Smoking status: Never     Smokeless tobacco: Never        Physical Exam:   GENERAL: Healthy, alert and no distress\",\"EYES: Eyes grossly normal to inspection.  No discharge or erythema, or obvious scleral/conjunctival abnormalities.\",\"RESP: No audible wheeze, cough, or visible cyanosis.  No visible retractions or increased work of breathing.  \",\"SKIN: Visible skin clear. No significant rash, abnormal pigmentation or lesions.\",\"NEURO: Cranial nerves grossly intact.  Mentation and speech appropriate for age.\",\"PSYCH: Mentation appears normal, affect normal/bright, judgement and insight intact, normal speech and appearance well-groomed.    Data:  RADIOLOGY EXAM   DATE AND TIME OF SERVICE: 10/16/2023 00:00:00   INDICATION: ICD-10 M81.0 Age-related osteoporosis without current   pathological fracture   osteoporosis     EXAM: DEXA SCAN       BONE MINERAL DENSITY STUDY-[employer]     RISK FACTORS FOR OSTEOPOROSIS:  Left hip fracture, has pins in left hip.     RESULTS:   LUMBAR SPINE, L1-L4:  BMD is 0.906 g/cm2.  This is 77% of the young adult   predicted peak and 92% of an aged-matched " population.  This provides a   T-score of -2.3.     RIGHT HIP, based on the lowest T-score, the right femoral neck BMD is   0.679 g/cm2.  This is 65% of the young adult predicted peak and 83% of the   aged-matched population.  This provides a T-score of -2.6.     CONCLUSION:   1. Osteoporosis with lowest T-score -2.6 femoral neck right hip.   2.  Since the most recent scan 10/14/2022, BMD has increased 10.2% in the   lumbar spine but decreased 5.1% in the right total hip.     RECOMMENDATIONS:   1. Consume calcium and vitamin D according to age specific   recommendations.   2. Some patients with osteopenia as well as those with osteoporosis   should be considered for additional medical treatment.   3.  Consider evaluation of any patient with low BMD for secondary causes   of osteoporosis.   4.  Followup DEXA scan in 2 years.     APPENDIX:   T and Z-SCORES:   WHO classification:  T-score -1.0 or greater is normal, -1.1 to -2.4 is   osteopenia, -2.5 or lower is osteoporosis.   a.  T-score of 1.0 equals 1 standard deviation.   b.  Use for postmenopausal women.   c.  Do not apply to healthy premenopausal women (use Z-scores not   T-scores).   d. Use for men age 50 and older.   e.  Fracture risk doubles for each 1.0 decrease in T-score.       Assessment and Plan:  #1 osteoporosis s/p 2 years of Forteo ending in Sept 2018 and start Prolia in October 2018. Pt is intolerant of Reclast.   Although she did have a right first toe fracture noted in September 2023, this is not an osteoporosis related fracture.  Bone density in October 2023 shows increase at the level of the back (may be due to degenerative change)-I think that differences at the level of the hip is likely due to positioning.  Continue with Prolia for now with Evenity if we have evidence of ongoing bone loss or future fractures.  Would recommend next bone density in October 2025.    #2 hypertension:   Better on spironolactone.    #3 Musculoskeletal concerns, now  "with \"burning of her right hip  Will have patient try Voltaren gel as well as low-dose gabapentin.  Recommended that patient follow this up with her primary provider.  My concern is for possibly neuropathy/pinched nerve that might be causing her symptoms.    #4  numbness in feet  Overall stable per patient report.    Continue with Prolia every 6 months.  Return visit in 1 year, DEXA scan in October 2025      Again, thank you for allowing me to participate in the care of your patient.      Sincerely,    Kavitha Casey MD    "

## 2024-07-29 NOTE — PROGRESS NOTES
Video-Visit Details    Type of service:  Video Visit    Virtual visit conducted by Kevin.      Originating Location (pt. Location): HOME    Distant Location (provider location):  Off site    Mode of Communication:  Video Conference via [employer]    Physician has received verbal consent for a Video Visit from the patient? YES      Kavitha Casey MD    7/29/24    [facility]  Endocrinology  Kavitha Casey MD    Chief Complaint:   Pt presents for follow up of osteoproosis    Start time 930, stop time 950, chart review, documentation time, coordination of care, 10 minutes, total time spent day of the encounter 30 minutes      History of Present Illness:   #1 osteoporosis s/p 2 years of Forteo ending in Sept 2018 and start Prolia in October 2018. Pt is intolerant of Reclast.   Patient was initially seen in 2015 and noted to have osteoporosis. She underwent menopause in 2007 and has not been on hormone replacement therapy. Patient started Forteo 2016. She was scheduled for her first dose of Prolia on 10/5/18 as she had already taken 2 years of Forteo and doesn't tolerate Reclast. She had a DEXA performed on 10/18/19 which showed osteoporosis and a 7.5% increase in bone density of her spine compared to 2018 DEXA.  August 2020 that showed an increase in bone density of about 6.8% of the lumbar spine and 14.8% at the level of the hip compared with 2017.  Lowest T score was -2.5 at the right hip.  October 2022 bone density showed general stability at the level of the back, and an increase in the bone density by 3.4% at the right total hip.  October 2022 TSH 1.12.  In September 2023, she had a left first toe fracture after she bumped her foot into a cabinet.      Interval History  Pt understands the importance of Prolia every 6 months at her local clinic.  She continues to report gait instability and difficulty walking.  October 2023 DEXA scan showed the lowest T-score -2.6 at the right hip, with noted increase of 10.2%  "at the lumbar spine but decreased at 5.1% in the right total hip.  By my review, this is thought to be due to differences in positioning.  Denies any recent fractures.    #2 hypertension:   In 2017 patient noted increasing blood pressures. She was told to follow up with her primary care provider.    Interval History  Currently on spironolactone.  Per patient report, this is generally improved.    #3 Musculoskeletal concerns, now with \"burning of her right hip  At her visit last year, she reports significant bilateral upper arm and shoulder weakness with associated pain.  She has been by sports medicine and underwent physical therapy.      Interval history: The patient still reports significant gait instability due to her prior history of hip fracture, and feeling \"unsteady.  She now reports new onset of \"burning\" of her right hip, which starts at her back and goes all the way to her foot.  This is roughly 5 out of 10 in intensity as well as a \"dull ache\" roughly 4 out of 10 in intensity.  He reports that this has been disruptive to her sleep.    #4  numbness in feet  Patient reports some numbness in her feet.      Interval history: She reports that this is a different issue than issue #3.    Review of Systems:   Pertinent items are noted in HPI, remainder of complete ROS is negative.         Active Medications:     Current Outpatient Medications:     alpha-lipoic acid 600 MG capsule, Take 1 capsule (600 mg) by mouth daily, Disp: 30 capsule, Rfl: 1    atorvastatin (LIPITOR) 20 MG tablet, Take 20 mg by mouth daily, Disp: , Rfl:     calcium carbonate (OS-MEGHAN 500 MG [place]. [place]) 500 MG tablet, Take 1,000 mg by mouth 2 times daily, Disp: , Rfl:     denosumab (PROLIA) 60 MG/ML SOSY injection, Inject 60 mg Subcutaneous once, Disp: , Rfl:     Famciclovir (FAMVIR PO), Take 500 mg by mouth As needed, Disp: , Rfl:     MAGNESIUM OXIDE PO, Take 250 mg by mouth, Disp: , Rfl:     Omega-3 Fatty Acids (FISH OIL PO), , Disp: , Rfl:     " "POTASSIUM CITRATE PO, , Disp: , Rfl:     spironolactone (ALDACTONE) 50 MG tablet, Take 50 mg by mouth, Disp: , Rfl:     VITAMIN D, CHOLECALCIFEROL, PO, Take 2,000 Units by mouth daily, Disp: , Rfl:       Allergies:   Tramadol; Reclast [zoledronic acid]; Aspirin; Codeine; and Rofecoxib      Past Medical History:  Osteoporosis  Dysphagia     Past Surgical History:  The patient has no known surgical history.    Family History:   No family history on file.      Social History:   Social History     Tobacco Use    Smoking status: Never    Smokeless tobacco: Never        Physical Exam:   GENERAL: Healthy, alert and no distress\",\"EYES: Eyes grossly normal to inspection.  No discharge or erythema, or obvious scleral/conjunctival abnormalities.\",\"RESP: No audible wheeze, cough, or visible cyanosis.  No visible retractions or increased work of breathing.  \",\"SKIN: Visible skin clear. No significant rash, abnormal pigmentation or lesions.\",\"NEURO: Cranial nerves grossly intact.  Mentation and speech appropriate for age.\",\"PSYCH: Mentation appears normal, affect normal/bright, judgement and insight intact, normal speech and appearance well-groomed.    Data:  RADIOLOGY EXAM   DATE AND TIME OF SERVICE: 10/16/2023 00:00:00   INDICATION: ICD-10 M81.0 Age-related osteoporosis without current   pathological fracture   osteoporosis     EXAM: DEXA SCAN       BONE MINERAL DENSITY STUDY-[employer]     RISK FACTORS FOR OSTEOPOROSIS:  Left hip fracture, has pins in left hip.     RESULTS:   LUMBAR SPINE, L1-L4:  BMD is 0.906 g/cm2.  This is 77% of the young adult   predicted peak and 92% of an aged-matched population.  This provides a   T-score of -2.3.     RIGHT HIP, based on the lowest T-score, the right femoral neck BMD is   0.679 g/cm2.  This is 65% of the young adult predicted peak and 83% of the   aged-matched population.  This provides a T-score of -2.6.     CONCLUSION:   1. Osteoporosis with lowest T-score -2.6 femoral neck right " "hip.   2.  Since the most recent scan 10/14/2022, BMD has increased 10.2% in the   lumbar spine but decreased 5.1% in the right total hip.     RECOMMENDATIONS:   1. Consume calcium and vitamin D according to age specific   recommendations.   2. Some patients with osteopenia as well as those with osteoporosis   should be considered for additional medical treatment.   3.  Consider evaluation of any patient with low BMD for secondary causes   of osteoporosis.   4.  Followup DEXA scan in 2 years.     APPENDIX:   T and Z-SCORES:   WHO classification:  T-score -1.0 or greater is normal, -1.1 to -2.4 is   osteopenia, -2.5 or lower is osteoporosis.   a.  T-score of 1.0 equals 1 standard deviation.   b.  Use for postmenopausal women.   c.  Do not apply to healthy premenopausal women (use Z-scores not   T-scores).   d. Use for men age 50 and older.   e.  Fracture risk doubles for each 1.0 decrease in T-score.       Assessment and Plan:  #1 osteoporosis s/p 2 years of Forteo ending in Sept 2018 and start Prolia in October 2018. Pt is intolerant of Reclast.   Although she did have a right first toe fracture noted in September 2023, this is not an osteoporosis related fracture.  Bone density in October 2023 shows increase at the level of the back (may be due to degenerative change)-I think that differences at the level of the hip is likely due to positioning.  Continue with Prolia for now with Evenity if we have evidence of ongoing bone loss or future fractures.  Would recommend next bone density in October 2025.    #2 hypertension:   Better on spironolactone.    #3 Musculoskeletal concerns, now with \"burning of her right hip  Will have patient try Voltaren gel as well as low-dose gabapentin.  Recommended that patient follow this up with her primary provider.  My concern is for possibly neuropathy/pinched nerve that might be causing her symptoms.    #4  numbness in feet  Overall stable per patient report.    Continue with Prolia " every 6 months.  Return visit in 1 year, DEXA scan in October 2025

## 2024-07-29 NOTE — NURSING NOTE
Current patient location: [facility] [address]    Is the patient currently in the state of [place]? YES    Visit mode:VIDEO    If the visit is dropped, the patient can be reconnected by: VIDEO VISIT: Send to e-mail at: [email or web address]    Will anyone else be joining the visit? NO  (If patient encounters technical issues they should call [phone])    How would you like to obtain your AVS? MyChart    Are changes needed to the allergy or medication list? Pt stated no changes to allergies and Pt stated no med changes    Are refills needed on medications prescribed by this physician? NO    Reason for visit: Follow Up    Verenice Salazar LPN LPN

## 2024-07-29 NOTE — TELEPHONE ENCOUNTER
----- Message from Dora GARZA sent at 7/29/2024 10:03 AM CDT -----    ----- Message -----  From: Kavitha Casey MD  Sent: 7/29/2024   9:32 AM CDT  To: [facility]ocrinology [facility]    Pleae make sure letter form today get sent to pt

## 2024-07-29 NOTE — LETTER
Patient:  Nathalia Syed    MsJ[address] Syed  [address] [facility] [address]    2024    Dear Mr. Delong     This letter is on behalf of Nathalia.  I am one of the endocrinologist working with her. She is now doing better but just needs to avoid risk for falls. She has been very compliant with the treatment program. She has the following issues:      #1 osteoporosis with fracture  This patient has osteoporosis with subsequent fracture.  She suffered multiple fractures and therefore is at high risk for future fractures even though she is complying with treatment.  I would recommend that accommodations be made to reduce her risk for fractures     #2 limitations created by health condition  I would recommend that the patient cannot lift more than 10 pounds as well as precautions  to reduce fall risks.     #3 accommodations adjusting to the work environment or schedule  I would recommend that the patient not be placed at risk for falls. 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 I anticipate that this would only take about 3-5% of her effort as she is able to function within the classroom.  She is otherwise functional and able to work and this very minor assistance (if needed) should not interfere with her ability to perform her job. I will be reassessing her yearly (she has an upcoming appointment).     If you have any questions, please feel free to contact my nurse at [phone] select option #3 for triage nurse.  Regards        Kavitha Casey MD, MS

## 2024-07-30 ENCOUNTER — TELEPHONE (OUTPATIENT)
Dept: ENDOCRINOLOGY | Facility: CLINIC | Age: 69
End: 2024-07-30
Payer: COMMERCIAL

## 2024-09-30 ENCOUNTER — TELEPHONE (OUTPATIENT)
Dept: ENDOCRINOLOGY | Facility: CLINIC | Age: 69
End: 2024-09-30
Payer: COMMERCIAL

## 2024-09-30 DIAGNOSIS — M81.0 SENILE OSTEOPOROSIS: Primary | ICD-10-CM

## 2024-09-30 NOTE — TELEPHONE ENCOUNTER
----- Message from Julieta NGUYEN sent at 9/29/2024  8:19 AM CDT -----  Regarding: Prolia  Looks like Loli is going to be doing her Prolia in [place]    Could you take the Infusion Appointment Request be taken out of the therapy plan, so the orders would not continue to fall into the infusion work que?     Thank you,  Julieta GARZA [facility] - 2nd Floor  [facility] Scheduling  [phone] (option 3 then option 2)  [facility] Scheduling   [phone] (option 5 then option 2)

## 2025-02-01 ENCOUNTER — HEALTH MAINTENANCE LETTER (OUTPATIENT)
Age: 70
End: 2025-02-01

## 2025-03-18 ENCOUNTER — TRANSCRIBE ORDERS (OUTPATIENT)
Dept: OTHER | Age: 70
End: 2025-03-18

## 2025-03-18 DIAGNOSIS — M81.0 AGE-RELATED OSTEOPOROSIS WITHOUT CURRENT PATHOLOGICAL FRACTURE: Primary | ICD-10-CM

## 2025-08-19 ENCOUNTER — TELEPHONE (OUTPATIENT)
Dept: ENDOCRINOLOGY | Facility: CLINIC | Age: 70
End: 2025-08-19
Payer: COMMERCIAL

## 2025-08-19 DIAGNOSIS — E88.819 INSULIN RESISTANCE: ICD-10-CM

## 2025-08-19 RX ORDER — METFORMIN HYDROCHLORIDE 500 MG/1
500 TABLET, EXTENDED RELEASE ORAL
Qty: 90 TABLET | Refills: 3 | Status: SHIPPED | OUTPATIENT
Start: 2025-08-19